# Patient Record
Sex: MALE | Race: WHITE | Employment: OTHER | URBAN - METROPOLITAN AREA
[De-identification: names, ages, dates, MRNs, and addresses within clinical notes are randomized per-mention and may not be internally consistent; named-entity substitution may affect disease eponyms.]

---

## 2017-04-09 ENCOUNTER — HOSPITAL ENCOUNTER (OUTPATIENT)
Facility: CLINIC | Age: 57
Discharge: HOME | End: 2017-04-09
Payer: COMMERCIAL

## 2017-04-09 DIAGNOSIS — Z13.1 DIABETES MELLITUS SCREENING: ICD-10-CM

## 2017-04-09 DIAGNOSIS — Z11.59 NEED FOR HEPATITIS C SCREENING TEST: ICD-10-CM

## 2017-04-09 DIAGNOSIS — Z11.4 SCREENING FOR HIV (HUMAN IMMUNODEFICIENCY VIRUS): ICD-10-CM

## 2017-04-09 LAB
GLUCOSE BLD-MCNC: 109 MG/DL (ref 70–99)
HEPATITIS C ANTIBODY: NONREACTIVE
HIV AG/AB: NONREACTIVE

## 2017-04-09 PROCEDURE — 36415 COLL VENOUS BLD VENIPUNCTURE: CPT

## 2017-04-09 PROCEDURE — 86803 HEPATITIS C AB TEST: CPT

## 2017-04-09 PROCEDURE — 87389 HIV-1 AG W/HIV-1&-2 AB AG IA: CPT

## 2017-04-09 PROCEDURE — 82947 ASSAY GLUCOSE BLOOD QUANT: CPT

## 2017-04-10 ENCOUNTER — PATIENT MESSAGE (OUTPATIENT)
Dept: FAMILY MEDICINE CLINIC | Age: 57
End: 2017-04-10

## 2017-05-19 ENCOUNTER — OFFICE VISIT (OUTPATIENT)
Dept: FAMILY MEDICINE CLINIC | Age: 57
End: 2017-05-19
Payer: COMMERCIAL

## 2017-05-19 VITALS
RESPIRATION RATE: 16 BRPM | WEIGHT: 203 LBS | HEART RATE: 84 BPM | DIASTOLIC BLOOD PRESSURE: 73 MMHG | BODY MASS INDEX: 29.06 KG/M2 | SYSTOLIC BLOOD PRESSURE: 117 MMHG

## 2017-05-19 DIAGNOSIS — M25.571 ACUTE RIGHT ANKLE PAIN: Primary | ICD-10-CM

## 2017-05-19 PROCEDURE — 99213 OFFICE O/P EST LOW 20 MIN: CPT | Performed by: NURSE PRACTITIONER

## 2017-05-19 ASSESSMENT — ENCOUNTER SYMPTOMS
SHORTNESS OF BREATH: 0
COLOR CHANGE: 0
VOMITING: 0
NAUSEA: 0

## 2017-05-22 DIAGNOSIS — F43.9 STRESS: ICD-10-CM

## 2017-06-19 ENCOUNTER — OFFICE VISIT (OUTPATIENT)
Dept: PODIATRY | Age: 57
End: 2017-06-19
Payer: COMMERCIAL

## 2017-06-19 VITALS
HEIGHT: 70 IN | HEART RATE: 81 BPM | SYSTOLIC BLOOD PRESSURE: 125 MMHG | DIASTOLIC BLOOD PRESSURE: 83 MMHG | WEIGHT: 196 LBS | BODY MASS INDEX: 28.06 KG/M2

## 2017-06-19 DIAGNOSIS — M25.473 ANKLE EDEMA: ICD-10-CM

## 2017-06-19 DIAGNOSIS — M76.61 ACHILLES TENDINITIS OF RIGHT LOWER EXTREMITY: Primary | ICD-10-CM

## 2017-06-19 DIAGNOSIS — M79.671 RIGHT FOOT PAIN: ICD-10-CM

## 2017-06-19 DIAGNOSIS — R60.0 EDEMA OF RIGHT FOOT: ICD-10-CM

## 2017-06-19 DIAGNOSIS — M21.861 GASTROCNEMIUS EQUINUS OF RIGHT LOWER EXTREMITY: ICD-10-CM

## 2017-06-19 DIAGNOSIS — M25.571 ACUTE RIGHT ANKLE PAIN: ICD-10-CM

## 2017-06-19 PROCEDURE — 99203 OFFICE O/P NEW LOW 30 MIN: CPT | Performed by: PODIATRIST

## 2017-06-19 ASSESSMENT — ENCOUNTER SYMPTOMS
DIARRHEA: 0
NAUSEA: 0
COLOR CHANGE: 0
BACK PAIN: 0
SHORTNESS OF BREATH: 0

## 2017-07-10 ENCOUNTER — OFFICE VISIT (OUTPATIENT)
Dept: PODIATRY | Age: 57
End: 2017-07-10
Payer: COMMERCIAL

## 2017-07-10 VITALS
BODY MASS INDEX: 28.06 KG/M2 | WEIGHT: 196 LBS | SYSTOLIC BLOOD PRESSURE: 125 MMHG | HEIGHT: 70 IN | HEART RATE: 76 BPM | DIASTOLIC BLOOD PRESSURE: 83 MMHG

## 2017-07-10 DIAGNOSIS — M25.473 ANKLE EDEMA: ICD-10-CM

## 2017-07-10 DIAGNOSIS — R60.0 EDEMA OF RIGHT FOOT: ICD-10-CM

## 2017-07-10 DIAGNOSIS — M21.861 GASTROCNEMIUS EQUINUS OF RIGHT LOWER EXTREMITY: ICD-10-CM

## 2017-07-10 DIAGNOSIS — M76.61 ACHILLES TENDINITIS OF RIGHT LOWER EXTREMITY: Primary | ICD-10-CM

## 2017-07-10 DIAGNOSIS — M25.571 ACUTE RIGHT ANKLE PAIN: ICD-10-CM

## 2017-07-10 DIAGNOSIS — M79.671 RIGHT FOOT PAIN: ICD-10-CM

## 2017-07-10 PROCEDURE — 99213 OFFICE O/P EST LOW 20 MIN: CPT | Performed by: PODIATRIST

## 2017-07-10 ASSESSMENT — ENCOUNTER SYMPTOMS
NAUSEA: 0
COLOR CHANGE: 0
DIARRHEA: 0
BACK PAIN: 0
SHORTNESS OF BREATH: 0

## 2017-08-17 DIAGNOSIS — F43.9 STRESS: ICD-10-CM

## 2017-08-21 ENCOUNTER — OFFICE VISIT (OUTPATIENT)
Dept: PODIATRY | Age: 57
End: 2017-08-21
Payer: COMMERCIAL

## 2017-08-21 VITALS
WEIGHT: 196 LBS | HEIGHT: 70 IN | BODY MASS INDEX: 28.06 KG/M2 | SYSTOLIC BLOOD PRESSURE: 130 MMHG | HEART RATE: 73 BPM | DIASTOLIC BLOOD PRESSURE: 82 MMHG

## 2017-08-21 DIAGNOSIS — M79.671 RIGHT FOOT PAIN: ICD-10-CM

## 2017-08-21 DIAGNOSIS — M76.61 ACHILLES TENDINITIS OF RIGHT LOWER EXTREMITY: Primary | ICD-10-CM

## 2017-08-21 DIAGNOSIS — R60.0 EDEMA OF RIGHT FOOT: ICD-10-CM

## 2017-08-21 DIAGNOSIS — M25.473 ANKLE EDEMA: ICD-10-CM

## 2017-08-21 DIAGNOSIS — M21.861 GASTROCNEMIUS EQUINUS OF RIGHT LOWER EXTREMITY: ICD-10-CM

## 2017-08-21 DIAGNOSIS — M25.571 ACUTE RIGHT ANKLE PAIN: ICD-10-CM

## 2017-08-21 PROCEDURE — 99213 OFFICE O/P EST LOW 20 MIN: CPT | Performed by: PODIATRIST

## 2017-08-21 ASSESSMENT — ENCOUNTER SYMPTOMS
SHORTNESS OF BREATH: 0
NAUSEA: 0
DIARRHEA: 0
COLOR CHANGE: 0
BACK PAIN: 0

## 2017-09-18 DIAGNOSIS — F43.9 STRESS: ICD-10-CM

## 2017-09-19 ENCOUNTER — OFFICE VISIT (OUTPATIENT)
Dept: FAMILY MEDICINE CLINIC | Age: 57
End: 2017-09-19
Payer: COMMERCIAL

## 2017-09-19 VITALS
BODY MASS INDEX: 29.22 KG/M2 | HEART RATE: 92 BPM | HEIGHT: 70 IN | DIASTOLIC BLOOD PRESSURE: 83 MMHG | RESPIRATION RATE: 16 BRPM | SYSTOLIC BLOOD PRESSURE: 128 MMHG | TEMPERATURE: 98 F | WEIGHT: 204.1 LBS

## 2017-09-19 DIAGNOSIS — F43.9 STRESS: Primary | ICD-10-CM

## 2017-09-19 DIAGNOSIS — E78.00 HIGH CHOLESTEROL: ICD-10-CM

## 2017-09-19 DIAGNOSIS — R03.0 ELEVATED BLOOD PRESSURE READING: ICD-10-CM

## 2017-09-19 DIAGNOSIS — R73.01 IMPAIRED FASTING GLUCOSE: ICD-10-CM

## 2017-09-19 DIAGNOSIS — Z23 IMMUNIZATION DUE: ICD-10-CM

## 2017-09-19 PROCEDURE — 90471 IMMUNIZATION ADMIN: CPT | Performed by: FAMILY MEDICINE

## 2017-09-19 PROCEDURE — 99214 OFFICE O/P EST MOD 30 MIN: CPT | Performed by: FAMILY MEDICINE

## 2017-09-19 PROCEDURE — 90688 IIV4 VACCINE SPLT 0.5 ML IM: CPT | Performed by: FAMILY MEDICINE

## 2017-09-21 ENCOUNTER — OFFICE VISIT (OUTPATIENT)
Dept: PODIATRY | Age: 57
End: 2017-09-21
Payer: COMMERCIAL

## 2017-09-21 VITALS
SYSTOLIC BLOOD PRESSURE: 129 MMHG | HEIGHT: 70 IN | DIASTOLIC BLOOD PRESSURE: 85 MMHG | HEART RATE: 81 BPM | WEIGHT: 196 LBS | BODY MASS INDEX: 28.06 KG/M2

## 2017-09-21 DIAGNOSIS — R60.0 EDEMA OF RIGHT FOOT: ICD-10-CM

## 2017-09-21 DIAGNOSIS — M76.61 ACHILLES TENDINITIS OF RIGHT LOWER EXTREMITY: Primary | ICD-10-CM

## 2017-09-21 DIAGNOSIS — M25.473 ANKLE EDEMA: ICD-10-CM

## 2017-09-21 DIAGNOSIS — M21.861 GASTROCNEMIUS EQUINUS OF RIGHT LOWER EXTREMITY: ICD-10-CM

## 2017-09-21 DIAGNOSIS — M25.571 ACUTE RIGHT ANKLE PAIN: ICD-10-CM

## 2017-09-21 DIAGNOSIS — M79.671 RIGHT FOOT PAIN: ICD-10-CM

## 2017-09-21 PROCEDURE — 99213 OFFICE O/P EST LOW 20 MIN: CPT | Performed by: PODIATRIST

## 2017-09-21 ASSESSMENT — ENCOUNTER SYMPTOMS
DIARRHEA: 0
NAUSEA: 0
SHORTNESS OF BREATH: 0
COLOR CHANGE: 0
BACK PAIN: 0

## 2017-10-04 ENCOUNTER — TELEPHONE (OUTPATIENT)
Dept: FAMILY MEDICINE CLINIC | Age: 57
End: 2017-10-04

## 2017-10-04 DIAGNOSIS — G56.00 CARPAL TUNNEL SYNDROME, UNSPECIFIED LATERALITY: Primary | ICD-10-CM

## 2017-10-04 NOTE — TELEPHONE ENCOUNTER
pt called stating he was just seen and forgot to ask about  referral for what he thinks is carpel tunnel.

## 2017-10-05 NOTE — TELEPHONE ENCOUNTER
Please call the patient and notify him that I have referred him to Dr. Hao Stratton. He likely has both trigger finger and carpal tunnel syndrome. Mail the referral to his home and closely encounter.

## 2017-10-19 ENCOUNTER — OFFICE VISIT (OUTPATIENT)
Dept: ORTHOPEDIC SURGERY | Age: 57
End: 2017-10-19
Payer: COMMERCIAL

## 2017-10-19 VITALS — WEIGHT: 198 LBS | BODY MASS INDEX: 28.35 KG/M2 | HEIGHT: 70 IN

## 2017-10-19 DIAGNOSIS — M65.332 TRIGGER MIDDLE FINGER OF LEFT HAND: ICD-10-CM

## 2017-10-19 DIAGNOSIS — M25.532 LEFT WRIST PAIN: Primary | ICD-10-CM

## 2017-10-19 PROCEDURE — 20600 DRAIN/INJ JOINT/BURSA W/O US: CPT | Performed by: ORTHOPAEDIC SURGERY

## 2017-10-19 PROCEDURE — 99203 OFFICE O/P NEW LOW 30 MIN: CPT | Performed by: ORTHOPAEDIC SURGERY

## 2017-10-19 RX ORDER — BETAMETHASONE SODIUM PHOSPHATE AND BETAMETHASONE ACETATE 3; 3 MG/ML; MG/ML
6 INJECTION, SUSPENSION INTRA-ARTICULAR; INTRALESIONAL; INTRAMUSCULAR; SOFT TISSUE ONCE
Status: COMPLETED | OUTPATIENT
Start: 2017-10-19 | End: 2017-10-19

## 2017-10-19 RX ORDER — LIDOCAINE HYDROCHLORIDE 10 MG/ML
1 INJECTION, SOLUTION EPIDURAL; INFILTRATION; INTRACAUDAL; PERINEURAL ONCE
Status: COMPLETED | OUTPATIENT
Start: 2017-10-19 | End: 2017-10-19

## 2017-10-19 RX ADMIN — BETAMETHASONE SODIUM PHOSPHATE AND BETAMETHASONE ACETATE 6 MG: 3; 3 INJECTION, SUSPENSION INTRA-ARTICULAR; INTRALESIONAL; INTRAMUSCULAR; SOFT TISSUE at 11:34

## 2017-10-19 RX ADMIN — LIDOCAINE HYDROCHLORIDE 1 ML: 10 INJECTION, SOLUTION EPIDURAL; INFILTRATION; INTRACAUDAL; PERINEURAL at 11:36

## 2017-10-19 NOTE — PROGRESS NOTES
Subjective:      Patient ID: Radha Martinez is a 62 y.o. male. Hand Pain    The incident occurred more than 1 week ago. The incident occurred at home. There was no injury mechanism. The pain is present in the left hand and left wrist. The quality of the pain is described as aching. The pain does not radiate. The pain is mild. The pain has been constant since the incident. Associated symptoms include numbness and tingling. Nothing aggravates the symptoms. He has tried nothing for the symptoms. The treatment provided mild relief. Patient presents with 3 issues. #1 left long finger trigger finger    History of right long finger trigger finger release with good relief results. #2 left ulnar sided wrist pain    #3 intermittent dysesthesia small and ring finger left hand that seems to originate at the hand and migrate proximally        Review of Systems   Neurological: Positive for tingling and numbness. Objective:   Physical Exam   Constitutional: He is oriented to person, place, and time. He appears well-developed and well-nourished. HENT:   Head: Normocephalic and atraumatic. Eyes: Conjunctivae and EOM are normal.   Neck: Normal range of motion. Pulmonary/Chest: Effort normal. No respiratory distress. Neurological: He is alert and oriented to person, place, and time. He has normal strength. No sensory deficit. Normal gait   Skin: Skin is warm and dry. Psychiatric: His behavior is normal. Thought content normal.   Nursing note and vitals reviewed.     Physical exam reveals no obvious tenderness palpation over the TFCC no significant pain with ulnar deviation and range of motion    Positive triggering of long finger quite substantial.    Examination ulnar nerve reveals a mild Tinel's but this does cause a dysesthesia in the typical distribution but not  Similar - started to hand and road radiate proximally    Diagnostic x-rays wrist AP lateral normal    Assessment:      Encounter Diagnoses   Name Primary?     Left wrist pain Yes    Trigger middle finger of left hand            Plan:      Inject left long finger trigger finger    Avoid highly elbow flexed positions    Follow-up 3 weeks

## 2017-10-20 ENCOUNTER — PATIENT MESSAGE (OUTPATIENT)
Dept: FAMILY MEDICINE CLINIC | Age: 57
End: 2017-10-20

## 2017-10-20 DIAGNOSIS — F43.9 STRESS: ICD-10-CM

## 2017-11-09 ENCOUNTER — OFFICE VISIT (OUTPATIENT)
Dept: ORTHOPEDIC SURGERY | Age: 57
End: 2017-11-09
Payer: COMMERCIAL

## 2017-11-09 DIAGNOSIS — M25.532 LEFT WRIST PAIN: Primary | ICD-10-CM

## 2017-11-09 DIAGNOSIS — M65.332 TRIGGER MIDDLE FINGER OF LEFT HAND: ICD-10-CM

## 2017-11-09 PROCEDURE — 99213 OFFICE O/P EST LOW 20 MIN: CPT | Performed by: ORTHOPAEDIC SURGERY

## 2017-11-09 NOTE — PROGRESS NOTES
Subjective:      Patient ID: Dragan Diaz is a 62 y.o. male. HPI      Patient here for follow-up #1 left long finger trigger finger, #2 left ulnar sided wrist pain #3 dysesthesia small and ring finger. On his last visit we injected as long finger trigger finger. Overall L3 symptoms of largely resolved and continue to improve      Review of Systems    Objective:   Physical Exam   Constitutional: He is oriented to person, place, and time. He appears well-developed and well-nourished. HENT:   Head: Normocephalic and atraumatic. Eyes: Conjunctivae and EOM are normal.   Neck: Normal range of motion. Pulmonary/Chest: Effort normal. No respiratory distress. Neurological: He is alert and oriented to person, place, and time. He has normal strength. No sensory deficit. Normal gait   Skin: Skin is warm and dry. Psychiatric: His behavior is normal. Thought content normal.   Nursing note and vitals reviewed. Assessment:      Encounter Diagnoses   Name Primary?     Left wrist pain Yes    Trigger middle finger of left hand        Appears the patient's symptom complex is largely all secondary to a long finger trigger finger as a all improved following injection        Plan:      Follow-up as needed

## 2018-02-03 ENCOUNTER — HOSPITAL ENCOUNTER (OUTPATIENT)
Facility: CLINIC | Age: 58
Discharge: HOME | End: 2018-02-03
Payer: COMMERCIAL

## 2018-02-03 DIAGNOSIS — R03.0 ELEVATED BLOOD PRESSURE READING: ICD-10-CM

## 2018-02-03 DIAGNOSIS — E78.00 HIGH CHOLESTEROL: ICD-10-CM

## 2018-02-03 DIAGNOSIS — R73.01 IMPAIRED FASTING GLUCOSE: ICD-10-CM

## 2018-02-03 LAB
ALBUMIN SERPL-MCNC: 4 G/DL (ref 3.5–5.2)
ALBUMIN/GLOBULIN RATIO: 1.5 (ref 1–2.5)
ALP BLD-CCNC: 53 U/L (ref 40–129)
ALT SERPL-CCNC: 40 U/L (ref 5–41)
ANION GAP SERPL CALCULATED.3IONS-SCNC: 10 MMOL/L (ref 9–17)
AST SERPL-CCNC: 26 U/L
BILIRUB SERPL-MCNC: 0.29 MG/DL (ref 0.3–1.2)
BUN BLDV-MCNC: 17 MG/DL (ref 6–20)
BUN/CREAT BLD: ABNORMAL (ref 9–20)
CALCIUM SERPL-MCNC: 9.1 MG/DL (ref 8.6–10.4)
CHLORIDE BLD-SCNC: 108 MMOL/L (ref 98–107)
CHOLESTEROL/HDL RATIO: 4.9
CHOLESTEROL: 228 MG/DL
CO2: 25 MMOL/L (ref 20–31)
CREAT SERPL-MCNC: 0.82 MG/DL (ref 0.7–1.2)
GFR AFRICAN AMERICAN: >60 ML/MIN
GFR NON-AFRICAN AMERICAN: >60 ML/MIN
GFR SERPL CREATININE-BSD FRML MDRD: ABNORMAL ML/MIN/{1.73_M2}
GFR SERPL CREATININE-BSD FRML MDRD: ABNORMAL ML/MIN/{1.73_M2}
GLUCOSE BLD-MCNC: 99 MG/DL (ref 70–99)
HDLC SERPL-MCNC: 47 MG/DL
LDL CHOLESTEROL: 158 MG/DL (ref 0–130)
POTASSIUM SERPL-SCNC: 4.8 MMOL/L (ref 3.7–5.3)
SODIUM BLD-SCNC: 143 MMOL/L (ref 135–144)
TOTAL PROTEIN: 6.7 G/DL (ref 6.4–8.3)
TRIGL SERPL-MCNC: 117 MG/DL
VLDLC SERPL CALC-MCNC: ABNORMAL MG/DL (ref 1–30)

## 2018-02-03 PROCEDURE — 80061 LIPID PANEL: CPT

## 2018-02-03 PROCEDURE — 36415 COLL VENOUS BLD VENIPUNCTURE: CPT

## 2018-02-03 PROCEDURE — 80053 COMPREHEN METABOLIC PANEL: CPT

## 2018-02-28 DIAGNOSIS — F43.9 STRESS: ICD-10-CM

## 2018-03-06 ENCOUNTER — OFFICE VISIT (OUTPATIENT)
Dept: FAMILY MEDICINE CLINIC | Age: 58
End: 2018-03-06
Payer: COMMERCIAL

## 2018-03-06 VITALS
SYSTOLIC BLOOD PRESSURE: 128 MMHG | HEART RATE: 78 BPM | DIASTOLIC BLOOD PRESSURE: 84 MMHG | BODY MASS INDEX: 29.78 KG/M2 | WEIGHT: 208.06 LBS | HEIGHT: 70 IN

## 2018-03-06 DIAGNOSIS — R73.01 IMPAIRED FASTING GLUCOSE: ICD-10-CM

## 2018-03-06 DIAGNOSIS — F43.9 STRESS: Primary | ICD-10-CM

## 2018-03-06 DIAGNOSIS — E78.00 HIGH CHOLESTEROL: ICD-10-CM

## 2018-03-06 LAB — HBA1C MFR BLD: 5.7 %

## 2018-03-06 PROCEDURE — 99214 OFFICE O/P EST MOD 30 MIN: CPT | Performed by: FAMILY MEDICINE

## 2018-03-06 PROCEDURE — 83036 HEMOGLOBIN GLYCOSYLATED A1C: CPT | Performed by: FAMILY MEDICINE

## 2018-03-06 NOTE — PATIENT INSTRUCTIONS
dry cough. Statins  Statins lower cholesterol. Examples include atorvastatin (Lipitor), lovastatin (Mevacor), pravastatin (Pravachol), and simvastatin (Zocor). Before you start taking a statin, make sure your doctor knows if:  · You have had a kidney transplant or other kidney problems. · You have liver disease. · You take any other prescription medicine, over-the-counter medicine, vitamins, supplements, or herbal remedies. · You are pregnant or breastfeeding. Statins can cause side effects. Call your doctor right away if you have:  · New, severe muscle aches. · Brown urine. Aspirin  Taking an aspirin every day can lower your risk for a heart attack. A heart attack occurs when a blood vessel in the heart gets blocked. When this happens, oxygen can't get to the heart muscle, and part of the heart dies. Aspirin can help prevent blood clots that can block the blood vessels. Talk to your doctor before you start taking aspirin every day. He or she may recommend that you take one low-dose aspirin (81 mg) tablet each day, with a meal and a full glass of water. Taking aspirin isn't right for everyone. This is because it can cause serious bleeding. And you may not be able to use aspirin if you:  · Have asthma. · Have an ulcer or other stomach problem. · Take some other medicine (called a blood thinner) that prevents blood clots. · Are allergic to aspirin. Before having a surgery or procedure, tell your doctor or dentist that you take aspirin. He or she will tell you if you should stop taking aspirin beforehand. Make sure that you understand exactly what your doctor wants you to do. Aspirin can cause side effects. Call your doctor right away if you have:  · Unusual bleeding or bruising. · Nausea, vomiting, or heartburn. · Black or bloody stools. Beta-blockers  Beta-blockers are used for three main reasons.  They lower blood pressure, relieve angina symptoms (such as chest pain or pressure), and reduce the

## 2018-03-06 NOTE — PROGRESS NOTES
Subjective:  Latrice Flores is in for continued evaluation and management. His chronic medical problems include the following; stress, impaired fasting glucose, and high cholesterol. He has anxiety. He is currently on sertraline 50 mg daily. He is doing well with medication. He denies any suicidal or homicidal ideation. He has a history of impaired fasting glucose. He denies any symptoms suggestive of hyper or hypoglycemia. He has high cholesterol. He is in a statin benefit group. I have recommended considering a statin. We did discuss the pathophysiology of cardiovascular disease. Review of systems per HPI, otherwise negative. Allergies; medications; past medical, surgical, family, and social history; and problem list reviewed as indicated in this encounter. Objective:  Vitals: Blood pressure 135/88, pulse 76, height 5' 10\" (1.778 m), weight 208 lb 1 oz (94.4 kg). Constitutional: He is oriented to person, place, and time. He appears well-developed and well-nourished and in no acute distress. Cardiovascular: Normal rate and regular rhythm, no murmur, rub, or gallop    Pulmonary/Chest: Effort normal and breath sounds normal. No rales or wheezes. No chest retraction. Extremities: no clubbing, cyanosis, or edema  Neurological:  CN II - XII grossly intact; no focal neurological deficits  Psychiatric:  Well groomed, well dressed. The patient maintains appropriate eye contact and does not appear to be responding to internal stimuli. No agitation    The 10-year ASCVD risk score (Ping Chacon, et al., 2013) is: 8.1%    Values used to calculate the score:      Age: 62 years      Sex: Male      Is Non- : No      Diabetic: No      Tobacco smoker: No      Systolic Blood Pressure: 294 mmHg      Is BP treated: No      HDL Cholesterol: 47 mg/dL      Total Cholesterol: 228 mg/dL     Hemoglobin A1c = 5.7%       Assessment/ Plan / Medical Decision Making  1.  Stress  sertraline

## 2018-05-03 DIAGNOSIS — F43.9 STRESS: ICD-10-CM

## 2018-10-08 ENCOUNTER — TELEPHONE (OUTPATIENT)
Dept: PRIMARY CARE CLINIC | Age: 58
End: 2018-10-08

## 2018-10-18 DIAGNOSIS — F43.9 STRESS: ICD-10-CM

## 2018-11-26 ENCOUNTER — OFFICE VISIT (OUTPATIENT)
Dept: PRIMARY CARE CLINIC | Age: 58
End: 2018-11-26
Payer: COMMERCIAL

## 2018-11-26 VITALS
RESPIRATION RATE: 16 BRPM | OXYGEN SATURATION: 98 % | HEART RATE: 86 BPM | SYSTOLIC BLOOD PRESSURE: 142 MMHG | BODY MASS INDEX: 29.73 KG/M2 | DIASTOLIC BLOOD PRESSURE: 84 MMHG | WEIGHT: 207.2 LBS

## 2018-11-26 DIAGNOSIS — R22.1 NECK MASS: ICD-10-CM

## 2018-11-26 DIAGNOSIS — R03.0 ELEVATED BLOOD PRESSURE READING: ICD-10-CM

## 2018-11-26 DIAGNOSIS — Z23 NEED FOR INFLUENZA VACCINATION: ICD-10-CM

## 2018-11-26 DIAGNOSIS — F43.9 STRESS: Primary | ICD-10-CM

## 2018-11-26 PROCEDURE — 90471 IMMUNIZATION ADMIN: CPT | Performed by: FAMILY MEDICINE

## 2018-11-26 PROCEDURE — 99214 OFFICE O/P EST MOD 30 MIN: CPT | Performed by: FAMILY MEDICINE

## 2018-11-26 PROCEDURE — 90686 IIV4 VACC NO PRSV 0.5 ML IM: CPT | Performed by: FAMILY MEDICINE

## 2018-11-26 ASSESSMENT — PATIENT HEALTH QUESTIONNAIRE - PHQ9
1. LITTLE INTEREST OR PLEASURE IN DOING THINGS: 0
SUM OF ALL RESPONSES TO PHQ QUESTIONS 1-9: 0
SUM OF ALL RESPONSES TO PHQ QUESTIONS 1-9: 0
SUM OF ALL RESPONSES TO PHQ9 QUESTIONS 1 & 2: 0
2. FEELING DOWN, DEPRESSED OR HOPELESS: 0

## 2018-11-26 NOTE — PROGRESS NOTES
Subjective:  Antonio Rodriguez presents for continued evaluation and management. He suffers from stress. He indicates that he is been on Zoloft for approximately 10 years. Medication helps him cope with the challenges of the day. He denies any suicidal or homicidal ideation. He would like to continue on the medication this time. He is complaining of a cyst.  It is located on the back of his neck. With respect to his blood pressure, he denies any chest pain or pressure. He denies any shortness of breath. Review of systems per HPI, otherwise negative. Allergies; medications; past medical, surgical, family, and social history; and problem list reviewed as indicated in this encounter. Objective:  BP (!) 142/84 (Site: Right Upper Arm, Position: Sitting, Cuff Size: Large Adult)   Pulse 86   Resp 16   Wt 207 lb 3.2 oz (94 kg)   SpO2 98%   BMI 29.73 kg/m²   Psychiatric:  Well groomed, well dressed. The patient maintains appropriate eye contact and does not appear to be responding to internal stimuli. No agitation  Skin: A 2.5 cm fluctuant mass is noted on the back of the patient's neck. Assessment:   Diagnosis Orders   1. Stress  sertraline (ZOLOFT) 50 MG tablet   2. Neck mass  [facility], Isael Akbar MD   3. Elevated blood pressure reading     4. Need for influenza vaccination  INFLUENZA, QUADV, 3 YRS AND OLDER, IM, PF, PREFILL SYR OR SDV, 0.5ML (FLUZONE QUADV, PF)       Plan:  Medications, laboratory testing, imaging, consultation, and follow up as documented in this encounter. He seems to be doing relatively well on Zoloft. I've encouraged him to Januvia the medication for the time being. With respect to his neck mass, refer to plastic surgery. I have recommended monitoring his blood pressure. Update immunizations. Follow up in our office in one year or as needed.

## 2020-01-20 ENCOUNTER — OFFICE VISIT (OUTPATIENT)
Dept: PRIMARY CARE CLINIC | Age: 60
End: 2020-01-20
Payer: COMMERCIAL

## 2020-01-20 VITALS
SYSTOLIC BLOOD PRESSURE: 150 MMHG | RESPIRATION RATE: 15 BRPM | DIASTOLIC BLOOD PRESSURE: 88 MMHG | BODY MASS INDEX: 31.34 KG/M2 | WEIGHT: 211.6 LBS | OXYGEN SATURATION: 98 % | HEART RATE: 87 BPM | HEIGHT: 69 IN

## 2020-01-20 PROCEDURE — 90471 IMMUNIZATION ADMIN: CPT | Performed by: NURSE PRACTITIONER

## 2020-01-20 PROCEDURE — 90686 IIV4 VACC NO PRSV 0.5 ML IM: CPT | Performed by: NURSE PRACTITIONER

## 2020-01-20 PROCEDURE — 99214 OFFICE O/P EST MOD 30 MIN: CPT | Performed by: NURSE PRACTITIONER

## 2020-01-20 ASSESSMENT — PATIENT HEALTH QUESTIONNAIRE - PHQ9
SUM OF ALL RESPONSES TO PHQ QUESTIONS 1-9: 0
SUM OF ALL RESPONSES TO PHQ9 QUESTIONS 1 & 2: 0
SUM OF ALL RESPONSES TO PHQ QUESTIONS 1-9: 0
1. LITTLE INTEREST OR PLEASURE IN DOING THINGS: 0
2. FEELING DOWN, DEPRESSED OR HOPELESS: 0

## 2020-01-20 NOTE — PROGRESS NOTES
appearance. HENT:      Head: Normocephalic. Eyes:      Pupils: Pupils are equal, round, and reactive to light. Neck:      Musculoskeletal: Normal range of motion and neck supple. Comments: Soft discrete mass, approximately 4cm in diameter noted to left cervical spine, no TTP, no erythema or drainage  Cardiovascular:      Rate and Rhythm: Normal rate and regular rhythm. Heart sounds: Normal heart sounds. Pulmonary:      Effort: Pulmonary effort is normal.      Breath sounds: Normal breath sounds. Musculoskeletal: Normal range of motion. Right lower leg: No edema. Left lower leg: No edema. Lymphadenopathy:      Cervical: No cervical adenopathy. Skin:     General: Skin is warm and dry. Neurological:      General: No focal deficit present. Mental Status: He is alert and oriented to person, place, and time. Psychiatric:         Mood and Affect: Mood normal.         Behavior: Behavior normal.           :       Diagnosis Orders   1. Encounter to establish care     2. Hyperlipidemia, unspecified hyperlipidemia type  Lipid Panel   3. Cyst, dermoid, scalp and neck  AUDRE[facility] - Grayson Denise MD, Plastic Surgery, [place]   4. Need for influenza vaccination  INFLUENZA, QUADV, 3 YRS AND OLDER, IM PF, PREFILL SYR OR SDV, 0.5ML (AFLURIA QUADV, PF)   5. Screening, anemia, deficiency, iron  CBC Auto Differential   6. Screening for diabetes mellitus  Comprehensive Metabolic Panel             :          1. Encounter to establish care  Screening labs ordered. Health maintenance discussed, due for repeat colonoscopy. Repeat /88 at end of visit. 2. Hyperlipidemia, unspecified hyperlipidemia type  Continue diet modifications and exercise, if lipids continue to be elevated, discussed considering medication.  - Lipid Panel; Future    3. Cyst, dermoid, scalp and neck  Referral for evaluation and removal of cyst on neck  - AUDREY Denise MD, Plastic Surgery, [place]    4.  Need for influenza vaccination  Given in office  - INFLUENZA, QUADV, 3 YRS AND OLDER, IM PF, PREFILL SYR OR SDV, 0.5ML (AFLURIA QUADV, PF)    5. Screening, anemia, deficiency, iron  - CBC Auto Differential; Future    6. Screening for diabetes mellitus  - Comprehensive Metabolic Panel; Future      Return in about 1 year (around 1/20/2021). Patient given educational materials - see patient instructions. Discussed use, benefit, and side effects of prescribed medications. All patient questions answered. Pt voiced understanding. Reviewed health maintenance. Instructed to continue current medications, diet and exercise. Patient agreed with treatment plan. Follow up as directed.        Electronicallysigned by BRIONNA Mello CNP on 1/21/2020 at 10:09 AM

## 2020-01-21 ENCOUNTER — PATIENT MESSAGE (OUTPATIENT)
Dept: PRIMARY CARE CLINIC | Age: 60
End: 2020-01-21

## 2020-01-21 ASSESSMENT — ENCOUNTER SYMPTOMS
CHEST TIGHTNESS: 0
RHINORRHEA: 0
DIARRHEA: 0
SHORTNESS OF BREATH: 0
COLOR CHANGE: 0
NAUSEA: 0
VOMITING: 0
ABDOMINAL PAIN: 0
SORE THROAT: 0

## 2020-01-31 ENCOUNTER — HOSPITAL ENCOUNTER (OUTPATIENT)
Age: 60
Setting detail: OUTPATIENT SURGERY
Discharge: HOME | End: 2020-01-31
Attending: SURGERY | Admitting: SURGERY
Payer: COMMERCIAL

## 2020-01-31 VITALS
SYSTOLIC BLOOD PRESSURE: 147 MMHG | HEIGHT: 70 IN | TEMPERATURE: 97.7 F | WEIGHT: 212 LBS | RESPIRATION RATE: 16 BRPM | HEART RATE: 73 BPM | DIASTOLIC BLOOD PRESSURE: 85 MMHG | BODY MASS INDEX: 30.35 KG/M2 | OXYGEN SATURATION: 95 %

## 2020-01-31 PROCEDURE — 7100000010 HC PHASE II RECOVERY - FIRST 15 MIN: Performed by: SURGERY

## 2020-01-31 PROCEDURE — 3600000002 HC SURGERY LEVEL 2 BASE: Performed by: SURGERY

## 2020-01-31 PROCEDURE — 2709999900 HC NON-CHARGEABLE SUPPLY: Performed by: SURGERY

## 2020-01-31 PROCEDURE — 7100000011 HC PHASE II RECOVERY - ADDTL 15 MIN: Performed by: SURGERY

## 2020-01-31 PROCEDURE — 88304 TISSUE EXAM BY PATHOLOGIST: CPT

## 2020-01-31 PROCEDURE — 3600000012 HC SURGERY LEVEL 2 ADDTL 15MIN: Performed by: SURGERY

## 2020-01-31 PROCEDURE — 2500000003 HC RX 250 WO HCPCS: Performed by: SURGERY

## 2020-01-31 RX ORDER — CEPHALEXIN 500 MG/1
500 CAPSULE ORAL 4 TIMES DAILY
Qty: 40 CAPSULE | Refills: 0 | Status: SHIPPED | OUTPATIENT
Start: 2020-01-31 | End: 2020-07-01 | Stop reason: ALTCHOICE

## 2020-01-31 RX ORDER — CHLORAL HYDRATE 500 MG
1000 CAPSULE ORAL DAILY
COMMUNITY

## 2020-01-31 RX ORDER — LIDOCAINE HYDROCHLORIDE AND EPINEPHRINE 10; 10 MG/ML; UG/ML
INJECTION, SOLUTION INFILTRATION; PERINEURAL PRN
Status: DISCONTINUED | OUTPATIENT
Start: 2020-01-31 | End: 2020-01-31 | Stop reason: ALTCHOICE

## 2020-01-31 RX ORDER — OXYCODONE HYDROCHLORIDE AND ACETAMINOPHEN 5; 325 MG/1; MG/1
1 TABLET ORAL EVERY 6 HOURS PRN
Qty: 12 TABLET | Refills: 0 | Status: SHIPPED | OUTPATIENT
Start: 2020-01-31 | End: 2020-02-07

## 2020-01-31 ASSESSMENT — PAIN SCALES - GENERAL: PAINLEVEL_OUTOF10: 0

## 2020-01-31 ASSESSMENT — PAIN - FUNCTIONAL ASSESSMENT: PAIN_FUNCTIONAL_ASSESSMENT: 0-10

## 2020-01-31 NOTE — BRIEF OP NOTE
Brief Postoperative Note  ______________________________________________________________    Patient: Cecilia Ng  YOB: 1960  MRN: [id number]  Date of Procedure: 1/31/2020    Pre-Op Diagnosis: DX SUBCUTANEOUS LESION NECK    Post-Op Diagnosis: Same       Procedure(s):  EXCISION SUBCUTANEOUS LESION NECK    Anesthesia: Anesthesia type not filed in the log.     Surgeon(s):  Terri Mason MD    Assistant:      Estimated Blood Loss (mL): less than 50     Complications: None    Specimens:   ID Type Source Tests Collected by Time Destination   A : SUBCUTANEOUS LESION POST NECK Tissue Neck SURGICAL PATHOLOGY Terri Mason MD 1/31/2020 2544        Implants:  * No implants in log *      Drains: * No LDAs found *    Findings: adherent to deep muscles    Terri Mason MD  Date: 1/31/2020  Time: 9:57 AM

## 2020-01-31 NOTE — H&P
History and Physical Update    Pt Name: Ivana Herrera  MRN: [id number]  YOB: 1960  Date of evaluation: 1/31/2020      [x] I have reviewed the progress note found in Epic by Carlos Chavez CNP from 01/20/2020 which meets the criteria for an Interval History and Physical note. [x] I have examined  Ivana Herrera a 61 y.o., male who is scheduled for a subcutaneous neck lesion excision by Dr. Jay Miller due to subcutaneous neck lesion. The patient denies health changes since his appointment with Carlos Chavez CNP on 01/20/2020. Pt denies fever, chills, productive cough, SOB, chest pain, open sores, rashes, and wounds. Pt denies history of diabetes, a MI, and respiratory disease. Fish oil was last taken this morning. Motrin was last taken on 01/30/2020. Vital signs: BP (!) 143/86   Pulse 84   Temp 98.2 °F (36.8 °C) (Oral)   Resp 16   Ht 5' 10\" (1.778 m)   Wt 212 lb (96.2 kg)   SpO2 94%   BMI 30.42 kg/m²      Allergies:  Patient has no known allergies. Past medical history, surgical history, social history, and family history were reviewed and updated in EPIC as indicated. Medications:    Prior to Admission medications    Medication Sig Start Date End Date Taking? Authorizing Provider   Omega-3 Fatty Acids (FISH OIL) 1000 MG CAPS Take 1,000 mg by mouth daily   Yes Historical Provider, MD   sertraline (ZOLOFT) 50 MG tablet TAKE ONE TABLET BY MOUTH DAILY 1/21/20  Yes BRIONNA Chaney CNP   Multiple Vitamin (MULTI-VITAMINS PO) Take by mouth   Yes Historical Provider, MD       This is a 61 y.o. male who is pleasant, cooperative, alert and oriented x 3, in no acute distress. Obese. Heart: Regular rate and rhythm without murmur, gallop, or rub. Lungs: Normal respiratory effort, unlabored, and clear to auscultation without wheezes or rales bilaterally. Abdomen: Soft, non-tender, non-distended with active bowel sounds.    Pedal pulses: 2+ bilaterally         Labs:  No results for input(s): HGB, HCT, WBC, MCV, PLT, NA, K, CL, CO2, BUN, CREATININE, GLUCOSE, INR, PROTIME, APTT, AST, ALT, LABALBU, HCG in the last 720 hours. JENNIFER Sullivan  Electronically signed 1/31/2020 at 8:25 AM        BRIONNA Goodman CNP   Nurse Practitioner   Nurse Practitioner   Progress Notes   Signed   Encounter Date:  1/20/2020          Related encounter: Office Visit from 1/20/2020 in [facility]         Signed            Show:Clear all  [x]Manual[x]Template[]Copied    Added by:  [x]BRIONNA Yousif CNP    []Sheba for details     [address] [facility]IMARY [facility]  [address] [place] [address]  Dept: [phone]  Dept Fax: [phone]     Jacinta Yost is a 61 y.o. male who presentstoday for his medical conditions/complaints as noted below.   Jacinta Yost is c/o of      Chief Complaint   Patient presents with    Establish Care    Hyperlipidemia    Stress            HPI:      Here to establish care  Chronic conditions include stress/anxiety well controlled with zoloft 50mg daily, and high cholesterol that he chooses to manage with diet and exercise, also takes daily multivitamin and coQ10  Has cyst on back of neck that he previously had a referral for cosmetic surgery for but never went, would like referral again to go have it evaluated/removed, had drained once by his dermatologist but it returned, has not gotten larger             Hemoglobin A1C (%)   Date Value   03/06/2018 5.7                                                                           ( goal A1C is < 7)   No results found for: LABMICR      LDL Cholesterol (mg/dL)   Date Value   02/03/2018 158 (H)   07/10/2016 145 (H)                                                   (goal LDL is <100)       AST (U/L)   Date Value   02/03/2018 26          ALT (U/L)   Date Value   02/03/2018 40      BUN (mg/dL)   Date Value   02/03/2018 17 BP Readings from Last 3 Encounters:   01/20/20 (!) 150/88   11/26/18 (!) 142/84   03/06/18 128/84                                                                                       ([id number])          Past Medical History:   Diagnosis Date    Adenomatous polyp      Diverticulosis      Hemorrhoids      High cholesterol 7/11/2016            Past Surgical History:   Procedure Laterality Date    COLONOSCOPY   04-15-16     ADENOMATOUS POLYP, DIVERTICULOSIS, INTERNAL AND EXTERNAL HEMORRHOIDS     FINGER TRIGGER RELEASE Right      KNEE SURGERY Right      VASECTOMY                   Family History   Problem Relation Age of Onset    Cancer Maternal Grandfather           Social History            Tobacco Use    Smoking status: Former Smoker       Packs/day: 0.25       Years: 15.00       Pack years: 3.75       Last attempt to quit: 01/2010       Years since quitting: 10.0    Smokeless tobacco: Never Used   Substance Use Topics    Alcohol use: Yes       Comment: rarely             Current Outpatient Medications   Medication Sig Dispense Refill    sertraline (ZOLOFT) 50 MG tablet TAKE ONE TABLET BY MOUTH DAILY 30 tablet 11    Multiple Vitamin (MULTI-VITAMINS PO) Take by mouth        Coenzyme Q10 (CO Q 10 PO) Take by mouth          No current facility-administered medications for this visit. No Known Allergies          Health Maintenance   Topic Date Due    DTaP/Tdap/Td vaccine (1 - Tdap) 04/13/1971    Shingles Vaccine (1 of 2) 04/13/2010    A1C test (Diabetic or Prediabetic)  03/06/2019    Colon cancer screen colonoscopy  04/15/2019    Lipid screen  02/03/2023    Flu vaccine  Completed    Hepatitis C screen  Completed    HIV screen  Completed    Pneumococcal 0-64 years Vaccine  Aged Out         Subjective:      Review of Systems   Constitutional: Negative for activity change, fatigue and fever. HENT: Negative for congestion, rhinorrhea and sore throat.     Eyes: Negative for visual disturbance. Respiratory: Negative for chest tightness and shortness of breath. Cardiovascular: Negative for chest pain and palpitations. Gastrointestinal: Negative for abdominal pain, diarrhea, nausea and vomiting. Endocrine: Negative for polydipsia. Genitourinary: Negative for difficulty urinating. Musculoskeletal: Negative for arthralgias and myalgias. Skin: Negative for color change. Cyst to left side cervical spine   Neurological: Negative for weakness and headaches. Psychiatric/Behavioral: Negative for behavioral problems. The patient is not nervous/anxious. Stress      All other systems negative  Objective:   BP (!) 150/88   Pulse 87   Resp 15   Ht 5' 9\" (1.753 m)   Wt 211 lb 9.6 oz (96 kg)   SpO2 98%   BMI 31.25 kg/m²   Physical Exam  Vitals signs reviewed. Constitutional:       General: He is not in acute distress. Appearance: Normal appearance. HENT:      Head: Normocephalic. Eyes:      Pupils: Pupils are equal, round, and reactive to light. Neck:      Musculoskeletal: Normal range of motion and neck supple. Comments: Soft discrete mass, approximately 4cm in diameter noted to left cervical spine, no TTP, no erythema or drainage  Cardiovascular:      Rate and Rhythm: Normal rate and regular rhythm. Heart sounds: Normal heart sounds. Pulmonary:      Effort: Pulmonary effort is normal.      Breath sounds: Normal breath sounds. Musculoskeletal: Normal range of motion. Right lower leg: No edema. Left lower leg: No edema. Lymphadenopathy:      Cervical: No cervical adenopathy. Skin:     General: Skin is warm and dry. Neurological:      General: No focal deficit present. Mental Status: He is alert and oriented to person, place, and time. Psychiatric:         Mood and Affect: Mood normal.         Behavior: Behavior normal.               :     Diagnosis Orders   1. Encounter to establish care      2.  Hyperlipidemia, unspecified hyperlipidemia type  Lipid Panel   3. Cyst, dermoid, scalp and neck  AUDREY Davidson MD, Plastic Surgery, [facility]   4. Need for influenza vaccination  INFLUENZA, QUADV, 3 YRS AND OLDER, IM PF, PREFILL SYR OR SDV, 0.5ML (AFLURIA QUADV, PF)   5. Screening, anemia, deficiency, iron  CBC Auto Differential   6. Screening for diabetes mellitus  Comprehensive Metabolic Panel            :         1. Encounter to establish care  Screening labs ordered. Health maintenance discussed, due for repeat colonoscopy. Repeat /88 at end of visit. 2. Hyperlipidemia, unspecified hyperlipidemia type  Continue diet modifications and exercise, if lipids continue to be elevated, discussed considering medication.  - Lipid Panel; Future     3. Cyst, dermoid, scalp and neck  Referral for evaluation and removal of cyst on neck  - AUDREY Davidson MD, Plastic Surgery, [facility]     4. Need for influenza vaccination  Given in office  - INFLUENZA, QUADV, 3 YRS AND OLDER, IM PF, PREFILL SYR OR SDV, 0.5ML (AFLURIA QUADV, PF)     5. Screening, anemia, deficiency, iron  - CBC Auto Differential; Future     6. Screening for diabetes mellitus  - Comprehensive Metabolic Panel; Future        Return in about 1 year (around 1/20/2021). Patient given educational materials - see patient instructions. Discussed use, benefit, and side effects of prescribed medications. All patient questions answered. Pt voiced understanding. Reviewed health maintenance. Instructed to continue current medications, diet and exercise. Patient agreed with treatment plan. Follow up as directed.          Electronicallysigned by BRIONNA Hinkle CNP on 1/21/2020 at 10:09 AM

## 2020-01-31 NOTE — OP NOTE
[address] [address]                [address] [facility], [address] [place] [address]                                OPERATIVE REPORT    PATIENT NAME: Suzanne Joy                  :        1960  MED REC NO:   [id number]                             ROOM:  ACCOUNT NO:   [de-identified]                           ADMIT DATE: 2020  PROVIDER:     Lachelle Krishnamurthy    DATE OF PROCEDURE:  2020    PREOPERATIVE DIAGNOSIS:  Subcutaneous lesion of the posterior neck. POSTOPERATIVE DIAGNOSIS:  Subcutaneous lesion of the posterior neck. PROCEDURES PERFORMED:  1. Excision of subcutaneous lesion of the neck noted to be densely  adherent to the muscle fascia approximately 5 cm in diameter. 2.  Complex multilayer closure of posterior neck wound. SURGEON:  Dr. Melly Alvarado:  Nelia Hanley    ANESTHESIA:  Local.    COMPLICATION:  None. ESTIMATED BLOOD LOSS:  Minimal.    INDICATION AND SIGNIFICANT HISTORY:  The patient is a very pleasant  54-year-old gentleman seen with fully growing subcutaneous lesion in the  posterior neck. Previous steroid injections were unsuccessful in  resolving this. Risks and benefits of wide excision were explained to  the patient. Risks such as bleeding, infection, scars, need for  additional procedures, wound healing problems, patient's  dissatisfaction, damage to the deeper structures, permanent sensation  changes, permanent pain, recurrence of the lesion, all explained. He  had the opportunity to ask variety of questions, all of which were  answered for him. He demonstrates understanding and provides informed  consent. OPERATIVE PROCEDURE:  The patient was marked in the preoperative holding  area and 1% lidocaine with epinephrine was infiltrated into the skin and  subcutaneous tissue in order to provide for local field block. Then,  the patient was brought to the operating room and placed in the prone  position. After sterile prep and drape, careful attention to aseptic  surgical technique was maintained at all times. A curvilinear incision  was carried down through the skin, initially blunt and sharp dissection  was used to dissect down through the subcutaneous tissue and here I was  able to identify a mostly fatty lesion, densely adherent to the muscles  of the posterior neck. Bovie electrocautery was used to obtain  meticulous hemostasis and it was gently dissected off of the muscle. The lesion was sent as specific pathology and noted to be approximately  5 cm in diameter. Wound was irrigated out. I inspected again for  meticulous hemostasis. Meticulous hemostasis having been validated with  the use of Bovie electrocautery. A series of deep fascial sutures of  3-0 Monocryl were used to tack down the deeply undermined flaps that  were 6 cm in each direction followed by deep dermal intracuticular 3-0  Monocryl suture followed by running intracuticular 3-0 Monocryl suture  followed by Steri-Strips in order to apply for a complex multilayered  closure 6 cm in length. Sterile dressings were applied. The patient  was transported to recovery room in stable condition. Sponge, needle,  and instrument counts were reported to be correct x2 at the end of case.         Jailene Bhagat    D: 01/31/2020 10:13:56       T: 01/31/2020 11:21:08     [email or web address]  Job#: [id number]     Doc#: [id number]    CC:  [address]

## 2020-02-03 LAB — SURGICAL PATHOLOGY REPORT: NORMAL

## 2020-02-06 ENCOUNTER — TELEPHONE (OUTPATIENT)
Dept: GASTROENTEROLOGY | Age: 60
End: 2020-02-06

## 2020-02-17 ENCOUNTER — HOSPITAL ENCOUNTER (OUTPATIENT)
Facility: CLINIC | Age: 60
Discharge: HOME | End: 2020-02-17
Payer: COMMERCIAL

## 2020-02-17 LAB
ABSOLUTE EOS #: 0.14 K/UL (ref 0–0.44)
ABSOLUTE IMMATURE GRANULOCYTE: 0.03 K/UL (ref 0–0.3)
ABSOLUTE LYMPH #: 1.82 K/UL (ref 1.1–3.7)
ABSOLUTE MONO #: 0.46 K/UL (ref 0.1–1.2)
ALBUMIN SERPL-MCNC: 3.8 G/DL (ref 3.5–5.2)
ALBUMIN/GLOBULIN RATIO: 1.6 (ref 1–2.5)
ALP BLD-CCNC: 56 U/L (ref 40–129)
ALT SERPL-CCNC: 46 U/L (ref 5–41)
ANION GAP SERPL CALCULATED.3IONS-SCNC: 13 MMOL/L (ref 9–17)
AST SERPL-CCNC: 28 U/L
BASOPHILS # BLD: 1 % (ref 0–2)
BASOPHILS ABSOLUTE: 0.05 K/UL (ref 0–0.2)
BILIRUB SERPL-MCNC: <0.1 MG/DL (ref 0.3–1.2)
BUN BLDV-MCNC: 19 MG/DL (ref 6–20)
BUN/CREAT BLD: ABNORMAL (ref 9–20)
CALCIUM SERPL-MCNC: 9.4 MG/DL (ref 8.6–10.4)
CHLORIDE BLD-SCNC: 110 MMOL/L (ref 98–107)
CHOLESTEROL/HDL RATIO: 5.1
CHOLESTEROL: 187 MG/DL
CO2: 21 MMOL/L (ref 20–31)
CREAT SERPL-MCNC: 0.83 MG/DL (ref 0.7–1.2)
DIFFERENTIAL TYPE: ABNORMAL
EOSINOPHILS RELATIVE PERCENT: 2 % (ref 1–4)
GFR AFRICAN AMERICAN: >60 ML/MIN
GFR NON-AFRICAN AMERICAN: >60 ML/MIN
GFR SERPL CREATININE-BSD FRML MDRD: ABNORMAL ML/MIN/{1.73_M2}
GFR SERPL CREATININE-BSD FRML MDRD: ABNORMAL ML/MIN/{1.73_M2}
GLUCOSE BLD-MCNC: 128 MG/DL (ref 70–99)
HCT VFR BLD CALC: 46 % (ref 40.7–50.3)
HDLC SERPL-MCNC: 37 MG/DL
HEMOGLOBIN: 15.2 G/DL (ref 13–17)
IMMATURE GRANULOCYTES: 0 %
LDL CHOLESTEROL: ABNORMAL MG/DL (ref 0–130)
LYMPHOCYTES # BLD: 25 % (ref 24–43)
MCH RBC QN AUTO: 33 PG (ref 25.2–33.5)
MCHC RBC AUTO-ENTMCNC: 33 G/DL (ref 28.4–34.8)
MCV RBC AUTO: 100 FL (ref 82.6–102.9)
MONOCYTES # BLD: 6 % (ref 3–12)
NRBC AUTOMATED: 0 PER 100 WBC
PDW BLD-RTO: 13.4 % (ref 11.8–14.4)
PLATELET # BLD: 259 K/UL (ref 138–453)
PLATELET ESTIMATE: ABNORMAL
PMV BLD AUTO: 11.5 FL (ref 8.1–13.5)
POTASSIUM SERPL-SCNC: 3.8 MMOL/L (ref 3.7–5.3)
RBC # BLD: 4.6 M/UL (ref 4.21–5.77)
RBC # BLD: ABNORMAL 10*6/UL
SEG NEUTROPHILS: 66 % (ref 36–65)
SEGMENTED NEUTROPHILS ABSOLUTE COUNT: 4.93 K/UL (ref 1.5–8.1)
SODIUM BLD-SCNC: 144 MMOL/L (ref 135–144)
TOTAL PROTEIN: 6.2 G/DL (ref 6.4–8.3)
TRIGL SERPL-MCNC: 570 MG/DL
VLDLC SERPL CALC-MCNC: ABNORMAL MG/DL (ref 1–30)
WBC # BLD: 7.4 K/UL (ref 3.5–11.3)
WBC # BLD: ABNORMAL 10*3/UL

## 2020-02-17 PROCEDURE — 85025 COMPLETE CBC W/AUTO DIFF WBC: CPT

## 2020-02-17 PROCEDURE — 80053 COMPREHEN METABOLIC PANEL: CPT

## 2020-02-17 PROCEDURE — 80061 LIPID PANEL: CPT

## 2020-02-17 PROCEDURE — 83721 ASSAY OF BLOOD LIPOPROTEIN: CPT

## 2020-02-17 PROCEDURE — 36415 COLL VENOUS BLD VENIPUNCTURE: CPT

## 2020-02-18 LAB — LDL CHOLESTEROL DIRECT: 99 MG/DL

## 2020-07-01 ENCOUNTER — TELEMEDICINE (OUTPATIENT)
Dept: PRIMARY CARE CLINIC | Age: 60
End: 2020-07-01
Payer: COMMERCIAL

## 2020-07-01 ENCOUNTER — NURSE TRIAGE (OUTPATIENT)
Dept: OTHER | Facility: CLINIC | Age: 60
End: 2020-07-01

## 2020-07-01 PROCEDURE — 99214 OFFICE O/P EST MOD 30 MIN: CPT | Performed by: NURSE PRACTITIONER

## 2020-07-01 ASSESSMENT — ENCOUNTER SYMPTOMS
RHINORRHEA: 0
NAUSEA: 0
DIARRHEA: 0
VOMITING: 0
COLOR CHANGE: 0
ABDOMINAL PAIN: 0
SHORTNESS OF BREATH: 0
SORE THROAT: 0
CHEST TIGHTNESS: 0

## 2020-07-01 NOTE — PROGRESS NOTES
[address] [facility] PRIMARY [facility]  [facility] [address] [facility] [address]  [address] [address]  Dept: [phone]  Dept Fax: [phone]    Ana Lilia Barrow is a 61 y.o. male who presentstoday for his medical conditions/complaints as noted below.   Ana Lilia Barrow is c/o of  Chief Complaint   Patient presents with    Ankle Pain         HPI:     Here via video visit with complaint of right ankle pain x 4 weeks ago, rates 6/10  Has twisted right ankle twice, using ibuprofen and tylenol with some relief, also bought brace that gives some supprt and seems to help  Denies any bruising or redness, has some slight swelling, denies any increased activity or running  Is concerned that he tore something, has hx of achilles tendonitis few years ago, he thinks this feels different, followed with podiatry Dr. Garth Childs  Has some slight decreased ROM, no loss of sensation, no numbness or tingling  Has appt with ortho in a few weeks, Dr. Amanda Mendez but would like to try to see someone sooner    Has open orders for repeat labs since he did not fast for previous screening labs   Managing HLD with diet and exercise  Stress/anxiety well controlled with zoloft 50 mg daily      Hemoglobin A1C (%)   Date Value   2018 5.7             ( goal A1C is < 7)   No results found for: LABMICR  LDL Cholesterol (mg/dL)   Date Value   2020 158 (H)   07/10/2016 145 (H)       (goal LDL is <100)   AST (U/L)   Date Value   2020 28     ALT (U/L)   Date Value   2020 46 (H)     BUN (mg/dL)   Date Value   2020 19     BP Readings from Last 3 Encounters:   20 (!) 147/85   20 (!) 150/88   18 (!) 142/84          ([id number])    Past Medical History:   Diagnosis Date    Adenomatous polyp     Diverticulosis     Hemorrhoids     High cholesterol 2016      Past Surgical History:   Procedure Laterality Date    COLONOSCOPY  04-15-16    ADENOMATOUS POLYP, DIVERTICULOSIS, INTERNAL AND EXTERNAL HEMORRHOIDS     FINGER TRIGGER RELEASE Right     KNEE SURGERY Right     NECK SURGERY N/A 1/31/2020    EXCISION SUBCUTANEOUS LESION NECK performed by Alda Hernandez MD at [address]  01/31/2020    EXCISION SUBCUTANEOUS LESION NECK (    VASECTOMY         Family History   Problem Relation Age of Onset    Cancer Maternal Grandfather        Social History     Tobacco Use    Smoking status: Former Smoker     Packs/day: 0.25     Years: 15.00     Pack years: 3.75     Last attempt to quit: 01/2010     Years since quitting: 10.5    Smokeless tobacco: Never Used   Substance Use Topics    Alcohol use: Yes     Comment: rarely      Current Outpatient Medications   Medication Sig Dispense Refill    Omega-3 Fatty Acids (FISH OIL) 1000 MG CAPS Take 1,000 mg by mouth daily      sertraline (ZOLOFT) 50 MG tablet TAKE ONE TABLET BY MOUTH DAILY 30 tablet 11    Multiple Vitamin (MULTI-VITAMINS PO) Take by mouth       No current facility-administered medications for this visit. No Known Allergies    Health Maintenance   Topic Date Due    DTaP/Tdap/Td vaccine (1 - Tdap) 04/13/1979    Shingles Vaccine (1 of 2) 04/13/2010    Colon cancer screen colonoscopy  04/15/2019    A1C test (Diabetic or Prediabetic)  01/21/2021 (Originally 3/6/2019)    Flu vaccine (1) 09/01/2020    Lipid screen  02/17/2025    Hepatitis C screen  Completed    HIV screen  Completed    Hepatitis A vaccine  Aged Out    Hepatitis B vaccine  Aged Out    Hib vaccine  Aged Out    Meningococcal (ACWY) vaccine  Aged Out    Pneumococcal 0-64 years Vaccine  Aged Out       Subjective:      Review of Systems   Constitutional: Negative for activity change, fatigue and fever. HENT: Negative for congestion, rhinorrhea and sore throat. Eyes: Negative for visual disturbance. Respiratory: Negative for chest tightness and shortness of breath. Cardiovascular: Negative for chest pain and palpitations. Gastrointestinal: Negative for abdominal pain, diarrhea, nausea and vomiting. Endocrine: Negative for polydipsia. Genitourinary: Negative for difficulty urinating. Musculoskeletal: Positive for arthralgias and myalgias. Skin: Negative for color change. Neurological: Negative for weakness and headaches. Psychiatric/Behavioral: Negative for behavioral problems. The patient is not nervous/anxious. All other systems reviewed and are negative. Objective: There were no vitals taken for this visit. Physical Exam  Constitutional:       General: He is not in acute distress. Appearance: He is not ill-appearing. Neurological:      Mental Status: He is alert and oriented to person, place, and time. Psychiatric:         Mood and Affect: Mood normal.         Behavior: Behavior normal.         Thought Content: Thought content normal.         Judgment: Judgment normal.           :       Diagnosis Orders   1. Achilles tendon pain  [facility], [facility], [place]   2. Acute right ankle pain  [facility], [place]   3. Stress     4. High cholesterol               :          1. Achilles tendon pain  2. Acute right ankle pain  Recommended pt see podiatry or ortho, opted for ortho in [place], referral given, continue RICE and NSAID as needed, declined steroid, continue brace for comfort, if pain persists or worsens, recommend follow up in office for physical exam  - [address], , Orthopedic Surgery, [place]    3. Stress/anxiety  Well controlled, continue zoloft 50mg daily    4. HLD  Stable, need repeat labs, open orders    Prefers not to come into office during Covid, would like to see in office when comfortable to recheck BP, has been elevated, asymptomatic.   The 10-year ASCVD risk score (Quan Lu, et al., 2013) is: 12.6%    Values used to calculate the score:      Age: 61 years      Sex: Male      Is Non-  authenticate this note.     Electronicallysigned by BRIONNA Batista CNP on 7/1/2020 at 3:56 PM

## 2020-08-02 ENCOUNTER — HOSPITAL ENCOUNTER (OUTPATIENT)
Age: 60
Setting detail: SPECIMEN
Discharge: HOME | End: 2020-08-02
Payer: COMMERCIAL

## 2020-08-02 ENCOUNTER — OFFICE VISIT (OUTPATIENT)
Dept: PRIMARY CARE CLINIC | Age: 60
End: 2020-08-02
Payer: COMMERCIAL

## 2020-08-02 VITALS — TEMPERATURE: 97.5 F | OXYGEN SATURATION: 96 % | HEART RATE: 70 BPM

## 2020-08-02 PROCEDURE — 99213 OFFICE O/P EST LOW 20 MIN: CPT | Performed by: NURSE PRACTITIONER

## 2020-08-02 RX ORDER — BENZONATATE 100 MG/1
100 CAPSULE ORAL 2 TIMES DAILY PRN
Qty: 20 CAPSULE | Refills: 0 | Status: SHIPPED | OUTPATIENT
Start: 2020-08-02 | End: 2020-08-09

## 2020-08-02 ASSESSMENT — ENCOUNTER SYMPTOMS
SWOLLEN GLANDS: 1
EYE ITCHING: 0
NAUSEA: 0
COUGH: 1
SHORTNESS OF BREATH: 0
SORE THROAT: 0
DIARRHEA: 0
ABDOMINAL PAIN: 0
EYE DISCHARGE: 0
CHEST TIGHTNESS: 0
ALLERGIC/IMMUNOLOGIC NEGATIVE: 1
VOMITING: 0
EYES NEGATIVE: 1

## 2020-08-02 ASSESSMENT — PATIENT HEALTH QUESTIONNAIRE - PHQ9
SUM OF ALL RESPONSES TO PHQ QUESTIONS 1-9: 0
1. LITTLE INTEREST OR PLEASURE IN DOING THINGS: 0
SUM OF ALL RESPONSES TO PHQ9 QUESTIONS 1 & 2: 0
SUM OF ALL RESPONSES TO PHQ QUESTIONS 1-9: 0
2. FEELING DOWN, DEPRESSED OR HOPELESS: 0

## 2020-08-02 NOTE — PROGRESS NOTES
[address] [facility]  [address] [place] [address] [address] [address]  Dept: [phone]  Dept Fax: [phone]    Jolene Kemp is a 61 y.o. male who presents today forhis medical conditions/complaints as noted below. Jolene Kemp is c/o of   Chief Complaint   Patient presents with    Covid Testing     swollen glands, headache      HPI:     Headache    This is a new problem. The current episode started in the past 7 days. The problem occurs daily. The problem has been waxing and waning. The pain is located in the frontal region. Associated symptoms include coughing and swollen glands. Pertinent negatives include no abdominal pain, dizziness, fever, nausea, neck pain, sore throat, vomiting or weakness. He has tried acetaminophen for the symptoms. The treatment provided mild relief.          Past Medical History:   Diagnosis Date    Adenomatous polyp     Diverticulosis     Hemorrhoids     High cholesterol 7/11/2016      Past Surgical History:   Procedure Laterality Date    COLONOSCOPY  04-15-16    ADENOMATOUS POLYP, DIVERTICULOSIS, INTERNAL AND EXTERNAL HEMORRHOIDS     FINGER TRIGGER RELEASE Right     KNEE SURGERY Right     NECK SURGERY N/A 1/31/2020    EXCISION SUBCUTANEOUS LESION NECK performed by Sybil Phillips MD at [facility].  01/31/2020    EXCISION SUBCUTANEOUS LESION NECK (    VASECTOMY         Family History   Problem Relation Age of Onset    Cancer Maternal Grandfather        Social History     Tobacco Use    Smoking status: Former Smoker     Packs/day: 0.25     Years: 15.00     Pack years: 3.75     Last attempt to quit: 01/2010     Years since quitting: 10.5    Smokeless tobacco: Never Used   Substance Use Topics    Alcohol use: Yes     Comment: rarely      Current Outpatient Medications   Medication Sig Dispense Refill    benzonatate (TESSALON) 100 MG capsule Take 1 capsule by mouth 2 times daily as needed for Cough 20 capsule movement or transmitted upper airway sounds. No decreased breath sounds, wheezing, rhonchi or rales. Abdominal:      General: Bowel sounds are normal.      Palpations: Abdomen is soft. Musculoskeletal: Normal range of motion. Lymphadenopathy:      Cervical: No cervical adenopathy. Skin:     General: Skin is warm and dry. Findings: No rash. Neurological:      Mental Status: He is alert and oriented to person, place, and time. Cranial Nerves: No cranial nerve deficit. Coordination: Coordination normal.      Deep Tendon Reflexes: Reflexes are normal and symmetric. Psychiatric:         Thought Content: Thought content normal.       Pulse 70   Temp 97.5 °F (36.4 °C) (Temporal)   SpO2 96%     Assessment:       Diagnosis Orders   1. Suspected COVID-19 virus infection  COVID-19 Ambulatory    benzonatate (TESSALON) 100 MG capsule   2. Acute intractable headache, unspecified headache type  COVID-19 Ambulatory   3. Glands swollen  COVID-19 Ambulatory   4. Cough  benzonatate (TESSALON) 100 MG capsule           Plan:     1.) Covid swab obtained and sent to lab- will call with results   2.) Quarantine while waiting for Covid results   3.) Symptom management encouraged   4.) Follow-up with PCP PRN     Advance Care Planning  People with COVID-19 may have no symptoms, mild symptoms, such as fever, cough, and shortness of breath or they may have more severe illness, developing severe and fatal pneumonia. As a result, Advance Care Planning with attention to naming a health care decision maker (someone you trust to make healthcare decisions for you if you could not speak for yourself) and sharing other health care preferences is important BEFORE a possible health crisis. Please contact your Primary Care Provider to discuss Advance Care Planning.     Preventing the Spread of Coronavirus Disease 2019 in Homes and Residential Communities  For the most recent information go to [employer]    Prevention steps for People with confirmed or suspected COVID-19 (including persons under investigation) who do not need to be hospitalized  and   People with confirmed COVID-19 who were hospitalized and determined to be medically stable to go home    Your healthcare provider and public health staff will evaluate whether you can be cared for at home. If it is determined that you do not need to be hospitalized and can be isolated at home, you will be monitored by staff from your local or state health department. You should follow the prevention steps below until a healthcare provider or local or state health department says you can return to your normal activities. Stay home except to get medical care  People who are mildly ill with COVID-19 are able to isolate at home during their illness. You should restrict activities outside your home, except for getting medical care. Do not go to work, school, or public areas. Avoid using public transportation, ride-sharing, or taxis. Separate yourself from other people and animals in your home  People: As much as possible, you should stay in a specific room and away from other people in your home. Also, you should use a separate bathroom, if available. Animals: You should restrict contact with pets and other animals while you are sick with COVID-19, just like you would around other people. Although there have not been reports of pets or other animals becoming sick with COVID-19, it is still recommended that people sick with COVID-19 limit contact with animals until more information is known about the virus. When possible, have another member of your household care for your animals while you are sick. If you are sick with COVID-19, avoid contact with your pet, including petting, snuggling, being kissed or licked, and sharing food.  If you must care for your pet or be around animals while you are sick, wash your hands before and after you interact with pets and wear a facemask. Call ahead before visiting your doctor  If you have a medical appointment, call the healthcare provider and tell them that you have or may have COVID-19. This will help the healthcare providers office take steps to keep other people from getting infected or exposed. Wear a facemask  You should wear a facemask when you are around other people (e.g., sharing a room or vehicle) or pets and before you enter a healthcare providers office. If you are not able to wear a facemask (for example, because it causes trouble breathing), then people who live with you should not stay in the same room with you, or they should wear a facemask if they enter your room. Cover your coughs and sneezes  Cover your mouth and nose with a tissue when you cough or sneeze. Throw used tissues in a lined trash can. Immediately wash your hands with soap and water for at least 20 seconds or, if soap and water are not available, clean your hands with an alcohol-based hand  that contains at least 60% alcohol. Clean your hands often  Wash your hands often with soap and water for at least 20 seconds, especially after blowing your nose, coughing, or sneezing; going to the bathroom; and before eating or preparing food. If soap and water are not readily available, use an alcohol-based hand  with at least 60% alcohol, covering all surfaces of your hands and rubbing them together until they feel dry. Soap and water are the best option if hands are visibly dirty. Avoid touching your eyes, nose, and mouth with unwashed hands. Avoid sharing personal household items  You should not share dishes, drinking glasses, cups, eating utensils, towels, or bedding with other people or pets in your home. After using these items, they should be washed thoroughly with soap and water.   Clean all high-touch surfaces everyday  High touch surfaces include counters, tabletops, doorknobs, bathroom fixtures, toilets, phones, keyboards, tablets, and bedside tables. Also, clean any surfaces that may have blood, stool, or body fluids on them. Use a household cleaning spray or wipe, according to the label instructions. Labels contain instructions for safe and effective use of the cleaning product including precautions you should take when applying the product, such as wearing gloves and making sure you have good ventilation during use of the product. Monitor your symptoms  Seek prompt medical attention if your illness is worsening (e.g., difficulty breathing). Before seeking care, call your healthcare provider and tell them that you have, or are being evaluated for, COVID-19. Put on a facemask before you enter the facility. These steps will help the healthcare providers office to keep other people in the office or waiting room from getting infected or exposed. Ask your healthcare provider to call the local or state health department. Persons who are placed under active monitoring or facilitated self-monitoring should follow instructions provided by their local health department or occupational health professionals, as appropriate. When working with your local health department check their available hours. If you have a medical emergency and need to call 911, notify the dispatch personnel that you have, or are being evaluated for COVID-19. If possible, put on a facemask before emergency medical services arrive. Discontinuing home isolation  Patients with confirmed COVID-19 should remain under home isolation precautions until the risk of secondary transmission to others is thought to be low. The decision to discontinue home isolation precautions should be made on a case-by-case basis, in consultation with healthcare providers and state and local health departments. Problem List     None           Patient given educationalmaterials - see patient instructions.   Discussed use,

## 2020-08-05 LAB — SARS-COV-2, NAA: DETECTED

## 2020-08-06 ENCOUNTER — TELEPHONE (OUTPATIENT)
Dept: ADMINISTRATIVE | Age: 60
End: 2020-08-06

## 2020-08-08 ENCOUNTER — TELEPHONE (OUTPATIENT)
Dept: PRIMARY CARE CLINIC | Age: 60
End: 2020-08-08

## 2020-08-08 NOTE — TELEPHONE ENCOUNTER
Patient was seen on 8/2 and was positive for covid. States he has a really bad cough and its getting worse. No SOB or chest tightness. Got tessalon but thinks he might need an oral steroid. Please advise.  Thank you

## 2020-08-10 RX ORDER — PREDNISONE 20 MG/1
40 TABLET ORAL DAILY
Qty: 10 TABLET | Refills: 0 | Status: SHIPPED | OUTPATIENT
Start: 2020-08-10 | End: 2020-08-15

## 2021-01-08 DIAGNOSIS — F43.9 STRESS: ICD-10-CM

## 2021-01-08 NOTE — TELEPHONE ENCOUNTER
Last OV 07/01/2020    Next OV LVM for pt to contact office to make appt    Health Maintenance   Topic Date Due    DTaP/Tdap/Td vaccine (1 - Tdap) 04/13/1979    Shingles Vaccine (1 of 2) 04/13/2010    Colon cancer screen colonoscopy  04/15/2019    Flu vaccine (1) 09/01/2020    A1C test (Diabetic or Prediabetic)  01/21/2021 (Originally 3/6/2019)    Lipid screen  02/17/2025    Hepatitis C screen  Completed    HIV screen  Completed    Hepatitis A vaccine  Aged Out    Hepatitis B vaccine  Aged Out    Hib vaccine  Aged Out    Meningococcal (ACWY) vaccine  Aged Out    Pneumococcal 0-64 years Vaccine  Aged Out             (applicable per patient's age: Cancer Screenings, Depression Screening, Fall Risk Screening, Immunizations)    Hemoglobin A1C (%)   Date Value   03/06/2018 5.7     LDL Cholesterol (mg/dL)   Date Value   02/17/2020          AST (U/L)   Date Value   02/17/2020 28     ALT (U/L)   Date Value   02/17/2020 46 (H)     BUN (mg/dL)   Date Value   02/17/2020 19      (goal A1C is < 7)   (goal LDL is <100) need 30-50% reduction from baseline     BP Readings from Last 3 Encounters:   01/31/20 (!) 147/85   01/20/20 (!) 150/88   11/26/18 (!) 142/84    (goal /80)      All Future Testing planned in [facility]:  Lab Frequency Next Occurrence   Hepatic Function Panel Once 01/11/2021   Hemoglobin A1C Once 01/11/2021   Lipid Panel Once 01/11/2021       Next Visit Date:  No future appointments.          Patient Active Problem List:     Stress     Adenomatous polyp     Hemorrhoids     Diverticulosis     Impaired fasting glucose     High cholesterol     Left wrist pain     Trigger middle finger of left hand

## 2021-01-22 ENCOUNTER — TELEPHONE (OUTPATIENT)
Dept: PRIMARY CARE CLINIC | Age: 61
End: 2021-01-22

## 2021-01-22 ENCOUNTER — OFFICE VISIT (OUTPATIENT)
Dept: PRIMARY CARE CLINIC | Age: 61
End: 2021-01-22
Payer: COMMERCIAL

## 2021-01-22 VITALS
WEIGHT: 208.2 LBS | DIASTOLIC BLOOD PRESSURE: 82 MMHG | BODY MASS INDEX: 29.87 KG/M2 | HEART RATE: 81 BPM | OXYGEN SATURATION: 97 % | SYSTOLIC BLOOD PRESSURE: 130 MMHG

## 2021-01-22 DIAGNOSIS — Z23 NEED FOR INFLUENZA VACCINATION: ICD-10-CM

## 2021-01-22 DIAGNOSIS — R22.1 LUMP ON NECK: Primary | ICD-10-CM

## 2021-01-22 DIAGNOSIS — L08.9 SKIN INFECTION: Primary | ICD-10-CM

## 2021-01-22 DIAGNOSIS — Z13.1 SCREENING FOR DIABETES MELLITUS: ICD-10-CM

## 2021-01-22 DIAGNOSIS — J34.89 NASAL MUCOSA DRY: ICD-10-CM

## 2021-01-22 DIAGNOSIS — E78.00 HIGH CHOLESTEROL: ICD-10-CM

## 2021-01-22 DIAGNOSIS — Z12.11 COLON CANCER SCREENING: ICD-10-CM

## 2021-01-22 DIAGNOSIS — M79.89 SOFT TISSUE MASS: ICD-10-CM

## 2021-01-22 DIAGNOSIS — Z13.0 SCREENING, ANEMIA, DEFICIENCY, IRON: ICD-10-CM

## 2021-01-22 DIAGNOSIS — Z86.010 HISTORY OF COLON POLYPS: ICD-10-CM

## 2021-01-22 PROCEDURE — 90686 IIV4 VACC NO PRSV 0.5 ML IM: CPT | Performed by: NURSE PRACTITIONER

## 2021-01-22 PROCEDURE — 90471 IMMUNIZATION ADMIN: CPT | Performed by: NURSE PRACTITIONER

## 2021-01-22 PROCEDURE — 99214 OFFICE O/P EST MOD 30 MIN: CPT | Performed by: NURSE PRACTITIONER

## 2021-01-22 RX ORDER — CEPHALEXIN 500 MG/1
500 CAPSULE ORAL 2 TIMES DAILY
Qty: 14 CAPSULE | Refills: 0 | Status: SHIPPED | OUTPATIENT
Start: 2021-01-22 | End: 2021-01-22 | Stop reason: SDUPTHER

## 2021-01-22 RX ORDER — SODIUM CHLORIDE/ALOE VERA
GEL (GRAM) NASAL PRN
Qty: 1 TUBE | Refills: 3 | Status: SHIPPED | OUTPATIENT
Start: 2021-01-22 | End: 2021-02-01

## 2021-01-22 RX ORDER — CEPHALEXIN 500 MG/1
500 CAPSULE ORAL 2 TIMES DAILY
Qty: 14 CAPSULE | Refills: 0 | Status: SHIPPED | OUTPATIENT
Start: 2021-01-22 | End: 2021-01-29

## 2021-01-22 ASSESSMENT — ENCOUNTER SYMPTOMS
RHINORRHEA: 0
VOMITING: 0
SHORTNESS OF BREATH: 0
COLOR CHANGE: 0
SORE THROAT: 0
ABDOMINAL PAIN: 0
CHEST TIGHTNESS: 0
DIARRHEA: 0
NAUSEA: 0

## 2021-01-22 ASSESSMENT — PATIENT HEALTH QUESTIONNAIRE - PHQ9
1. LITTLE INTEREST OR PLEASURE IN DOING THINGS: 0
SUM OF ALL RESPONSES TO PHQ QUESTIONS 1-9: 0
SUM OF ALL RESPONSES TO PHQ QUESTIONS 1-9: 0

## 2021-01-22 NOTE — PROGRESS NOTES
ADENOMATOUS POLYP, DIVERTICULOSIS, INTERNAL AND EXTERNAL HEMORRHOIDS     FINGER TRIGGER RELEASE Right     KNEE SURGERY Right     NECK SURGERY N/A 2020    EXCISION SUBCUTANEOUS LESION NECK performed by Estephania Madera MD at [facility]  2020    EXCISION SUBCUTANEOUS LESION NECK (    VASECTOMY         Family History   Problem Relation Age of Onset    Cancer Maternal Grandfather        Social History     Tobacco Use    Smoking status: Former Smoker     Packs/day: 0.25     Years: 15.00     Pack years: 3.75     Quit date: 2010     Years since quittin.0    Smokeless tobacco: Never Used   Substance Use Topics    Alcohol use: Yes     Comment: rarely      Current Outpatient Medications   Medication Sig Dispense Refill    saline nasal gel (AYR) GEL by Nasal route as needed for Congestion 1 Tube 3    sertraline (ZOLOFT) 50 MG tablet TAKE 1 TABLET BY MOUTH ONE TIME A DAY 90 tablet 1    Omega-3 Fatty Acids (FISH OIL) 1000 MG CAPS Take 1,000 mg by mouth daily      Multiple Vitamin (MULTI-VITAMINS PO) Take by mouth       No current facility-administered medications for this visit. No Known Allergies    Health Maintenance   Topic Date Due    DTaP/Tdap/Td vaccine (1 - Tdap) 1979    Shingles Vaccine (1 of 2) 2010    A1C test (Diabetic or Prediabetic)  2019    Colon cancer screen colonoscopy  04/15/2019    Lipid screen  2025    Flu vaccine  Completed    Hepatitis C screen  Completed    HIV screen  Completed    Hepatitis A vaccine  Aged Out    Hepatitis B vaccine  Aged Out    Hib vaccine  Aged Out    Meningococcal (ACWY) vaccine  Aged Out    Pneumococcal 0-64 years Vaccine  Aged Out       Subjective:      Review of Systems   Constitutional: Negative for activity change, fatigue and fever. HENT: Negative for congestion, rhinorrhea and sore throat. Eyes: Negative for visual disturbance. Respiratory: Negative for chest tightness and shortness of breath. Cardiovascular: Negative for chest pain and palpitations. Gastrointestinal: Negative for abdominal pain, diarrhea, nausea and vomiting. Endocrine: Negative for polydipsia. Genitourinary: Negative for difficulty urinating. Musculoskeletal: Positive for neck pain (lump on neck). Negative for arthralgias and myalgias. Skin: Negative for color change. Neurological: Negative for weakness and headaches. Psychiatric/Behavioral: Negative for behavioral problems. The patient is not nervous/anxious. All other systems reviewed and are negative. Objective:   /82 (Site: Left Upper Arm, Position: Sitting)   Pulse 81   Wt 208 lb 3.2 oz (94.4 kg)   SpO2 97%   BMI 29.87 kg/m²   Physical Exam  Vitals signs reviewed. Constitutional:       General: He is not in acute distress. Appearance: Normal appearance. HENT:      Head: Normocephalic. Eyes:      Pupils: Pupils are equal, round, and reactive to light. Cardiovascular:      Rate and Rhythm: Normal rate and regular rhythm. Pulses: Normal pulses. Heart sounds: Normal heart sounds. Pulmonary:      Effort: Pulmonary effort is normal.      Breath sounds: Normal breath sounds. Abdominal:      General: There is no distension. Musculoskeletal: Normal range of motion. Right lower leg: No edema. Left lower leg: No edema. Skin:     General: Skin is warm and dry. Capillary Refill: Capillary refill takes less than 2 seconds. Comments: Red, swollen mass about 1cm in diameter to posterior neck at base of hairline, firm but movable, no drainage or bleeding   Neurological:      General: No focal deficit present. Mental Status: He is alert and oriented to person, place, and time.    Psychiatric:         Mood and Affect: Mood normal.         Behavior: Behavior normal.           :       Diagnosis Orders Patient given educational materials - see patient instructions. Discussed use, benefit, and side effects of prescribed medications. All patient questions answered. Pt voiced understanding. Reviewed health maintenance. Instructed to continue current medications, diet and exercise. Patient agreed with treatment plan. Follow up as directed.        Electronicallysigned by BRIONNA Woodard CNP on 1/22/2021 at 11:51 AM

## 2021-01-22 NOTE — PROGRESS NOTES
Vaccine Information Sheet, \"Influenza - Inactivated\"  given to Roxanne Hook, or parent/legal guardian of  Roxanne Hook and verbalized understanding. Patient responses:    Have you ever had a reaction to a flu vaccine? No  Are you able to eat eggs without adverse effects? Yes  Do you have any current illness? No  Have you ever had Guillian Ludell Syndrome? No    Flu vaccine given per order. Please see immunization tab.

## 2021-01-25 ENCOUNTER — OFFICE VISIT (OUTPATIENT)
Dept: SURGERY | Age: 61
End: 2021-01-25
Payer: COMMERCIAL

## 2021-01-25 VITALS
BODY MASS INDEX: 29.92 KG/M2 | SYSTOLIC BLOOD PRESSURE: 137 MMHG | OXYGEN SATURATION: 95 % | HEART RATE: 74 BPM | WEIGHT: 209 LBS | HEIGHT: 70 IN | DIASTOLIC BLOOD PRESSURE: 87 MMHG

## 2021-01-25 DIAGNOSIS — D36.9 ADENOMATOUS POLYP: Primary | ICD-10-CM

## 2021-01-25 DIAGNOSIS — L72.0 EPIDERMOID CYST OF NECK: ICD-10-CM

## 2021-01-25 PROCEDURE — 99203 OFFICE O/P NEW LOW 30 MIN: CPT | Performed by: SURGERY

## 2021-01-25 NOTE — LETTER
[address]  [facility]  Phone: [phone]  Fax: [phone]      21    Patient: Ruba Farfan  MRN: [id number]  : 1960  Date of visit: 2021    Dear BRIONNA Hammond - CNP:      Thank you for requesting consultation for Ruba Farfan in regards to evaluation for neck cyst and history of colon adenomatous polyp. Below are the relevant portions of my assessment and plan of care. If you have questions, please do not hesitate to call me. I look forward to following Ruba Farfan along with you.     Sincerely,        Katerina Hopper, DO

## 2021-01-25 NOTE — PROGRESS NOTES
[facility] General Surgery   History & Physical  Demario Atkins DO  Pt Name: Chuck Carlson  MRN: [id number]  YOB: 1960  Date of evaluation: 1/25/2021  Primary Care Physician: BRIONNA Weaver CNP    Chief Complaint:   Chief Complaint   Patient presents with    New Patient     Patient states he has a \"bump\" on the back of his neck. SUBJECTIVE:    History of Present Illness: This is a 61 y.o.  male who presents for evaluation for two issues. Pt reports mass on back of neck enlarged over the past several weeks, denies any history of infection or drainage but reports it is uncomfortable and would like to have this removed. Pt also with history of adenomatous polyp removed by colonoscopy in 2016 and in need of repeat colonoscopy. No other complaints at this time. Past Medical History   has a past medical history of Adenomatous polyp, Diverticulosis, Hemorrhoids, and High cholesterol. Past Surgical History   has a past surgical history that includes Finger trigger release (Right); knee surgery (Right); Vasectomy; Colonoscopy (04-15-16); other surgical history (01/31/2020); and Neck surgery (N/A, 1/31/2020). Family History  family history includes Cancer in his maternal grandfather. Social History  Tobacco use:  reports that he quit smoking about 11 years ago. He has a 3.75 pack-year smoking history. He has never used smokeless tobacco.  Alcohol use:  reports current alcohol use. Drug use:  reports no history of drug use.       Medications  Current Medications:   Current Outpatient Medications   Medication Sig Dispense Refill    cephALEXin (KEFLEX) 500 MG capsule Take 1 capsule by mouth 2 times daily for 7 days 14 capsule 0    sertraline (ZOLOFT) 50 MG tablet TAKE 1 TABLET BY MOUTH ONE TIME A DAY 90 tablet 1    Omega-3 Fatty Acids (FISH OIL) 1000 MG CAPS Take 1,000 mg by mouth daily      Multiple Vitamin (MULTI-VITAMINS PO) Take by mouth  saline nasal gel (AYR) GEL by Nasal route as needed for Congestion (Patient not taking: Reported on 1/25/2021) 1 Tube 3     No current facility-administered medications for this visit. Home Medications:   Prior to Admission medications    Medication Sig Start Date End Date Taking? Authorizing Provider   cephALEXin (KEFLEX) 500 MG capsule Take 1 capsule by mouth 2 times daily for 7 days 1/22/21 1/29/21 Yes BRIONNA Yousif CNP   sertraline (ZOLOFT) 50 MG tablet TAKE 1 TABLET BY MOUTH ONE TIME A DAY 1/8/21  Yes BRIONAN Yousif CNP   Omega-3 Fatty Acids (FISH OIL) 1000 MG CAPS Take 1,000 mg by mouth daily   Yes Historical Provider, MD   Multiple Vitamin (MULTI-VITAMINS PO) Take by mouth   Yes Historical Provider, MD   saline nasal gel (AYR) GEL by Nasal route as needed for Congestion  Patient not taking: Reported on 1/25/2021 1/22/21   BRIONNA Clinton CNP       Allergies  Patient has no known allergies. Review of Systems:  General: Denies any fever, chills. Eyes: Denies any changes in vision, diplopia or eye pain  Ears, Nose, Mouth: Denies changes in hearing/tinnitus or drainage from ears, no rhinorrhea or bloody nose, no difficulty chewing  Throat: no difficulty swallowing, no throat pain  Respiratory: Denies any shortness of breath or cough. Cardiac: Denies any chest pain, palpitations, claudication or edema. Gastrointestinal: Denies any melena, hematochezia, hematemesis or pyrosis. Genitourinary: Denies any frequency, urgency, hesitancy or incontinence. Musculoskeletal: Denies worsening muscle weakness or recent trauma  Skin: Denies rashes or lesions  Psychiatric: Denies any recent changes in mood or affect  Hematologic: Denies bruising or bleeding easily.     PHYSICAL EXAMINATION  Vitals:   Vitals:    01/25/21 1022   BP: 137/87   Pulse:    SpO2:        General Appearance:  awake, alert, no acute distress, well developed, well nourished Skin:  2.5cm subcutaneous mass with smooth borders consistent with epidermoid cyst without evidence of bleeding/infection. Head/face:  NCAT, face symmetrical  Eyes:  PERRL, no evidence of conjunctivitis or ptosis bilaterally  Ears:  External ears and canals grossly normal, no evidence of otorrhea. Nose/Sinuses:  Nares normal. Septum midline. Mucosa normal. No external drainage noted. Mouth/Neck:  Mucosa moist.  No external oral lesions. Trachea midline. No visible masses. Lungs:  Normal chest expansion, unlabored breathing without accessory muscle use. No audible rales, rhonchi, or wheezing. Cardiovascular: S1S2. No evidence of JVD. No evidence of pulsatile masses in abdomen  Abdomen:  Soft, non-tender, no organomegaly, no masses. Musculoskeletal: No evidence of bony/muscular deformities, trauma, atrophy of either left/right upper/lower extremity. No evidence of digital clubbing or cyanosis. Neurologic:  CN 2-12 grossly intact without obvious deficits. Grossly normal sensation in all extremities. Psychiatric: appropriate judgement and insight, appropriate recall of recent and remote memory, no evidence of depression/anxiety/agitation      DIAGNOSES:   Diagnosis Orders   1. Adenomatous polyp     2. Epidermoid cyst of neck         PLAN:  · Schedule for surveillance colonoscopy and excision of neck cyst under MAC. The risks include bleeding, infection, scarring, perforation, damage to surrounding tissues, need for further surgery in the future. The benefits, alternatives, complications and procedure details were explained to the patient, all questions were answered.   ·       Medical Decision Making: low complexity     Electronically signed by Kacie De Leon DO on 1/25/2021 at 10:40 AM

## 2021-01-27 ENCOUNTER — TELEPHONE (OUTPATIENT)
Dept: GASTROENTEROLOGY | Age: 61
End: 2021-01-27

## 2021-01-27 NOTE — TELEPHONE ENCOUNTER
1st attempt; called and LVM for pt regarding colon screen    2nd attempt; mailed colon screen letter to pt's home address.

## 2021-01-27 NOTE — TELEPHONE ENCOUNTER
Pt called back and states he is already scheduled for colon with Dr Barbra Leung office. Sending referral back to provider.

## 2021-02-01 NOTE — PROGRESS NOTES
DAY OF SURGERY/PROCEDURE  GUIDELINES    As a patient at the [facility] you can expect quality medical and nursing care that is centered on your individual needs. It is our goal to make your surgical experience as comfortable and excellent as possible.  ________________________________________________________________________    The following instructions are general guidelines, if any information on this sheet is different from what your doctor has instructed you to do, please follow your doctor's instructions. · Please arrive on time or your procedure may be rescheduled. · Enter through front entrance C of the building. · Upon arrival you will be taken to the pre-operative area to get ready for surgery  · You will be given a specific time when you will NOT be able to eat, drink, smoke, suck or chew ANYTHING (no water, gum, mints, cigarettes, cigars, pipes, snuff, chewing tobacco, etc.) or your surgery may be canceled. This will occur during your PAT appointment or by phone 1-2 days before surgery  · Take a shower or bath on the morning of your surgery/procedure (Hibiclens if directed)  · Brush your teeth, but do not swallow any water  · IN CASE OF ILLNESS - If you have a cold or flu symptoms (high fever, runny nose, sore throat, cough, etc.) rash, nausea, vomiting, loose stools, and/or recent contact with someone who has a contagious disease (chick pox, measles, etc.) please call your doctor before coming to the surgery center  · Take a small sip of water with heart, blood pressure, and/or seizure medication the morning of surgery.  (DO NOT take blood pressure medications that contain a diuretic)  · If applicable bring your:  · Inhaler (s)  · Hearing aid(s)  · Eyeglasses and Case (If you wear contacts they have to be removed before surgery, bring case and solution)  · Any paperwork given to you by your doctor  · Any X-rays you were told to bring · A copy of your Living Will or Durable Power of     · DO NOT take anticoagulants (blood thinners, aspiring or aspirin-containing products) two weeks prior to your surgery. · DO NOT take any diabetic pills or insulin. Please bring your sliding scale instructions and sick day plan with you. If you have a low blood sugar reaction after midnight, drink 4 ounces of apple juice or regular pop. · Wear loose, comfortable clothing that is easy to put on and take off. · You will be returning home the same day as your surgery, you will need to have a responsible adult (25years of age or older) present to drive you home. You will need someone to stay with you at home for the first 24 hours following your surgery. This is due to the anesthesia and the medications given to you during surgery and recovery. · Your doctor may talk with your family immediately following your procedure. Depending on your needs, you will stay in the recovery room between 30 minutes to 2 hours. · While you are recovering, the surgery family waiting room  can answer many of your family's questions. All medically related questions will be answered by a medical professional. If you had outpatient surgery, you will meet your family for discharge instructions before leaving.

## 2021-02-04 ENCOUNTER — OFFICE VISIT (OUTPATIENT)
Dept: SURGERY | Age: 61
End: 2021-02-04
Payer: COMMERCIAL

## 2021-02-04 VITALS — WEIGHT: 208.8 LBS | BODY MASS INDEX: 29.89 KG/M2 | HEIGHT: 70 IN

## 2021-02-04 DIAGNOSIS — L72.0 INFECTED EPIDERMOID CYST: Primary | ICD-10-CM

## 2021-02-04 DIAGNOSIS — L08.9 INFECTED EPIDERMOID CYST: Primary | ICD-10-CM

## 2021-02-04 PROCEDURE — 10060 I&D ABSCESS SIMPLE/SINGLE: CPT | Performed by: SURGERY

## 2021-02-04 RX ORDER — LIDOCAINE HYDROCHLORIDE AND EPINEPHRINE 10; 10 MG/ML; UG/ML
10 INJECTION, SOLUTION INFILTRATION; PERINEURAL ONCE
Status: COMPLETED | OUTPATIENT
Start: 2021-02-04 | End: 2021-02-04

## 2021-02-04 RX ORDER — SULFAMETHOXAZOLE AND TRIMETHOPRIM 800; 160 MG/1; MG/1
1 TABLET ORAL 2 TIMES DAILY
Qty: 14 TABLET | Refills: 0 | Status: SHIPPED | OUTPATIENT
Start: 2021-02-04 | End: 2021-02-11

## 2021-02-04 RX ADMIN — LIDOCAINE HYDROCHLORIDE AND EPINEPHRINE 10 ML: 10; 10 INJECTION, SOLUTION INFILTRATION; PERINEURAL at 10:12

## 2021-02-04 NOTE — LETTER
[facility] Gen Surg  [address]  Phone: [phone]  Fax: [phone]      21    Patient: Todd Rangel  MRN: [id number]  : 1960  Date of visit: 2021    Dear BRIONNA Marin - CNP:      I saw Todd Rangel in clinic, his cyst was showing sings of infection and was incised/drained. Below are the relevant portions of my assessment and plan of care. If you have questions, please do not hesitate to call me. I look forward to following Todd Rangel along with you.     Sincerely,        Leory Powell, DO

## 2021-02-04 NOTE — PROGRESS NOTES
[facility]  Progress Note    PATIENT NAME: Forbes Severin     CLINIC VISIT DATE: 2/4/2021    SUBJECTIVE:  Forbes Severin is a 61 y.o. male who presents to the clinic today for evaluation after contacting the clinic complaining of worsening pressure and pain at the site of cyst.       OBJECTIVE:    Ht 5' 10\" (1.778 m)   Wt 208 lb 12.8 oz (94.7 kg)   BMI 29.96 kg/m²     General Appearance:  awake, alert, no acute distress, well developed, well nourished   Skin:  Slightly increased erythema and new area of fluctuance distal to the previously identified dermoid cyst  Lungs:  Normal chest expansion, unlabored breathing without accessory muscle use. No audible rales, rhonchi, or wheezing. Cardiovascular: S1S2. No evidence of JVD. No evidence of pulsatile masses in abdomen  Abdomen:  Soft, non-tender, no organomegaly, no masses. Musculoskeletal: No evidence of bony/muscular deformities, trauma, atrophy of either left/right upper/lower extremity. No evidence of digital clubbing or cyanosis. PROCEDURE NOTE    DATE OF PROCEDURE: 2/4/21      SURGEON: Dr. Naresh Mohr DIAGNOSIS : infected dermoid cyst requiring procedural drainage    POSTOPERATIVE DIAGNOSIS: Same     PROCEDURE: incision and drainage of cyst    ANESTHESIA: Local anesthesia with 3cc 1% lidocaine w/ epinephrine    ESTIMATED BLOOD LOSS:  Less than 2cc    COMPLICATIONS: None. SPECIMENS:  none    WOUND CLASS 3    OPERATIVE DETAIL:  The patient was placed on procedure table in prone position. Timeout was performed verifying correct patient, position, equipment and procedure to be performed. Consent obtained for incision/drainage. Area cleansed with betadine and locally anesthetized with 1% lidocaine with epinephrine. Abscess incised with scalpel. Cavity explored with hemostats. No loculations found. There was a generous amount of purulent material expressed, the cavity was irrigated. The overlying thin skin was excised. Hemostasis achieved and maintained with direct manual compression. The skin defect was closed with suture of 3-0 nylon then covered with bacitracin and bandage. All sponge needle and instrument counts correct at the end of the case. Pt tolerated procedure well without issue. ASSESSMENT:  1. Infected epidermoid cyst          PLAN:  1. Wash twice daily with soap and water and cover with bacitracin/bandage  2. F/U at scheduled colonoscopy, wound check at that time  3.  Bactrim DS for 7d for infected cyst    Electronically signed by Pavel Waters DO on 2/4/2021 at 9:21 AM

## 2021-02-09 ENCOUNTER — HOSPITAL ENCOUNTER (OUTPATIENT)
Age: 61
Discharge: HOME | End: 2021-02-09
Payer: COMMERCIAL

## 2021-02-09 PROCEDURE — 93005 ELECTROCARDIOGRAM TRACING: CPT | Performed by: ANESTHESIOLOGY

## 2021-02-10 ENCOUNTER — ANESTHESIA EVENT (OUTPATIENT)
Dept: OPERATING ROOM | Age: 61
End: 2021-02-10
Payer: COMMERCIAL

## 2021-02-10 LAB
EKG ATRIAL RATE: 91 BPM
EKG P AXIS: 42 DEGREES
EKG P-R INTERVAL: 144 MS
EKG Q-T INTERVAL: 374 MS
EKG QRS DURATION: 114 MS
EKG QTC CALCULATION (BAZETT): 460 MS
EKG R AXIS: -32 DEGREES
EKG T AXIS: 30 DEGREES
EKG VENTRICULAR RATE: 91 BPM

## 2021-02-10 PROCEDURE — 93010 ELECTROCARDIOGRAM REPORT: CPT | Performed by: INTERNAL MEDICINE

## 2021-02-12 ENCOUNTER — ANESTHESIA (OUTPATIENT)
Dept: OPERATING ROOM | Age: 61
End: 2021-02-12
Payer: COMMERCIAL

## 2021-02-12 ENCOUNTER — HOSPITAL ENCOUNTER (OUTPATIENT)
Age: 61
Setting detail: OUTPATIENT SURGERY
Discharge: HOME | End: 2021-02-12
Attending: SURGERY | Admitting: SURGERY
Payer: COMMERCIAL

## 2021-02-12 VITALS
HEART RATE: 75 BPM | DIASTOLIC BLOOD PRESSURE: 93 MMHG | OXYGEN SATURATION: 97 % | HEIGHT: 70 IN | BODY MASS INDEX: 29.22 KG/M2 | WEIGHT: 204.13 LBS | RESPIRATION RATE: 20 BRPM | SYSTOLIC BLOOD PRESSURE: 113 MMHG | TEMPERATURE: 98.2 F

## 2021-02-12 VITALS
DIASTOLIC BLOOD PRESSURE: 58 MMHG | SYSTOLIC BLOOD PRESSURE: 106 MMHG | RESPIRATION RATE: 21 BRPM | OXYGEN SATURATION: 95 %

## 2021-02-12 DIAGNOSIS — Z01.818 PRE-OP TESTING: Primary | ICD-10-CM

## 2021-02-12 PROCEDURE — 3609027000 HC COLONOSCOPY: Performed by: SURGERY

## 2021-02-12 PROCEDURE — 7100000000 HC PACU RECOVERY - FIRST 15 MIN: Performed by: SURGERY

## 2021-02-12 PROCEDURE — 7100000010 HC PHASE II RECOVERY - FIRST 15 MIN: Performed by: SURGERY

## 2021-02-12 PROCEDURE — 6360000002 HC RX W HCPCS: Performed by: NURSE ANESTHETIST, CERTIFIED REGISTERED

## 2021-02-12 PROCEDURE — 2580000003 HC RX 258: Performed by: NURSE ANESTHETIST, CERTIFIED REGISTERED

## 2021-02-12 PROCEDURE — 7100000011 HC PHASE II RECOVERY - ADDTL 15 MIN: Performed by: SURGERY

## 2021-02-12 PROCEDURE — 2500000003 HC RX 250 WO HCPCS: Performed by: NURSE ANESTHETIST, CERTIFIED REGISTERED

## 2021-02-12 PROCEDURE — 2709999900 HC NON-CHARGEABLE SUPPLY: Performed by: SURGERY

## 2021-02-12 PROCEDURE — 45378 DIAGNOSTIC COLONOSCOPY: CPT | Performed by: SURGERY

## 2021-02-12 PROCEDURE — 3700000000 HC ANESTHESIA ATTENDED CARE: Performed by: SURGERY

## 2021-02-12 PROCEDURE — 3700000001 HC ADD 15 MINUTES (ANESTHESIA): Performed by: SURGERY

## 2021-02-12 RX ORDER — PROPOFOL 10 MG/ML
INJECTION, EMULSION INTRAVENOUS PRN
Status: DISCONTINUED | OUTPATIENT
Start: 2021-02-12 | End: 2021-02-12 | Stop reason: SDUPTHER

## 2021-02-12 RX ORDER — PROMETHAZINE HYDROCHLORIDE 25 MG/ML
6.25 INJECTION, SOLUTION INTRAMUSCULAR; INTRAVENOUS
Status: DISCONTINUED | OUTPATIENT
Start: 2021-02-12 | End: 2021-02-12 | Stop reason: HOSPADM

## 2021-02-12 RX ORDER — LIDOCAINE HYDROCHLORIDE 10 MG/ML
INJECTION, SOLUTION EPIDURAL; INFILTRATION; INTRACAUDAL; PERINEURAL PRN
Status: DISCONTINUED | OUTPATIENT
Start: 2021-02-12 | End: 2021-02-12 | Stop reason: SDUPTHER

## 2021-02-12 RX ORDER — SODIUM CHLORIDE 0.9 % (FLUSH) 0.9 %
10 SYRINGE (ML) INJECTION PRN
Status: DISCONTINUED | OUTPATIENT
Start: 2021-02-12 | End: 2021-02-12 | Stop reason: HOSPADM

## 2021-02-12 RX ORDER — MEPERIDINE HYDROCHLORIDE 50 MG/ML
12.5 INJECTION INTRAMUSCULAR; INTRAVENOUS; SUBCUTANEOUS EVERY 5 MIN PRN
Status: DISCONTINUED | OUTPATIENT
Start: 2021-02-12 | End: 2021-02-12 | Stop reason: HOSPADM

## 2021-02-12 RX ORDER — FENTANYL CITRATE 50 UG/ML
25 INJECTION, SOLUTION INTRAMUSCULAR; INTRAVENOUS EVERY 5 MIN PRN
Status: DISCONTINUED | OUTPATIENT
Start: 2021-02-12 | End: 2021-02-12 | Stop reason: HOSPADM

## 2021-02-12 RX ORDER — MORPHINE SULFATE 1 MG/ML
1 INJECTION, SOLUTION EPIDURAL; INTRATHECAL; INTRAVENOUS EVERY 5 MIN PRN
Status: DISCONTINUED | OUTPATIENT
Start: 2021-02-12 | End: 2021-02-12 | Stop reason: HOSPADM

## 2021-02-12 RX ORDER — SODIUM CHLORIDE, SODIUM LACTATE, POTASSIUM CHLORIDE, CALCIUM CHLORIDE 600; 310; 30; 20 MG/100ML; MG/100ML; MG/100ML; MG/100ML
INJECTION, SOLUTION INTRAVENOUS CONTINUOUS PRN
Status: DISCONTINUED | OUTPATIENT
Start: 2021-02-12 | End: 2021-02-12 | Stop reason: SDUPTHER

## 2021-02-12 RX ORDER — DIPHENHYDRAMINE HYDROCHLORIDE 50 MG/ML
12.5 INJECTION INTRAMUSCULAR; INTRAVENOUS
Status: DISCONTINUED | OUTPATIENT
Start: 2021-02-12 | End: 2021-02-12 | Stop reason: HOSPADM

## 2021-02-12 RX ORDER — ONDANSETRON 2 MG/ML
4 INJECTION INTRAMUSCULAR; INTRAVENOUS
Status: DISCONTINUED | OUTPATIENT
Start: 2021-02-12 | End: 2021-02-12 | Stop reason: HOSPADM

## 2021-02-12 RX ORDER — HYDROCODONE BITARTRATE AND ACETAMINOPHEN 5; 325 MG/1; MG/1
1 TABLET ORAL PRN
Status: DISCONTINUED | OUTPATIENT
Start: 2021-02-12 | End: 2021-02-12 | Stop reason: HOSPADM

## 2021-02-12 RX ORDER — HYDROCODONE BITARTRATE AND ACETAMINOPHEN 5; 325 MG/1; MG/1
2 TABLET ORAL PRN
Status: DISCONTINUED | OUTPATIENT
Start: 2021-02-12 | End: 2021-02-12 | Stop reason: HOSPADM

## 2021-02-12 RX ORDER — HYDRALAZINE HYDROCHLORIDE 20 MG/ML
5 INJECTION INTRAMUSCULAR; INTRAVENOUS EVERY 10 MIN PRN
Status: DISCONTINUED | OUTPATIENT
Start: 2021-02-12 | End: 2021-02-12 | Stop reason: HOSPADM

## 2021-02-12 RX ORDER — SODIUM CHLORIDE 0.9 % (FLUSH) 0.9 %
10 SYRINGE (ML) INJECTION EVERY 12 HOURS SCHEDULED
Status: DISCONTINUED | OUTPATIENT
Start: 2021-02-12 | End: 2021-02-12 | Stop reason: HOSPADM

## 2021-02-12 RX ADMIN — PROPOFOL 50 MG: 10 INJECTION, EMULSION INTRAVENOUS at 13:12

## 2021-02-12 RX ADMIN — PROPOFOL 50 MG: 10 INJECTION, EMULSION INTRAVENOUS at 13:29

## 2021-02-12 RX ADMIN — PROPOFOL 50 MG: 10 INJECTION, EMULSION INTRAVENOUS at 13:27

## 2021-02-12 RX ADMIN — PROPOFOL 50 MG: 10 INJECTION, EMULSION INTRAVENOUS at 13:19

## 2021-02-12 RX ADMIN — PROPOFOL 50 MG: 10 INJECTION, EMULSION INTRAVENOUS at 13:25

## 2021-02-12 RX ADMIN — PROPOFOL 50 MG: 10 INJECTION, EMULSION INTRAVENOUS at 13:23

## 2021-02-12 RX ADMIN — SODIUM CHLORIDE, POTASSIUM CHLORIDE, SODIUM LACTATE AND CALCIUM CHLORIDE: 600; 310; 30; 20 INJECTION, SOLUTION INTRAVENOUS at 13:07

## 2021-02-12 RX ADMIN — PROPOFOL 50 MG: 10 INJECTION, EMULSION INTRAVENOUS at 13:21

## 2021-02-12 RX ADMIN — PROPOFOL 50 MG: 10 INJECTION, EMULSION INTRAVENOUS at 13:16

## 2021-02-12 RX ADMIN — PROPOFOL 50 MG: 10 INJECTION, EMULSION INTRAVENOUS at 13:31

## 2021-02-12 RX ADMIN — PROPOFOL 30 MG: 10 INJECTION, EMULSION INTRAVENOUS at 13:13

## 2021-02-12 RX ADMIN — LIDOCAINE HYDROCHLORIDE 20 MG: 10 INJECTION, SOLUTION EPIDURAL; INFILTRATION; INTRACAUDAL; PERINEURAL at 13:10

## 2021-02-12 RX ADMIN — PROPOFOL 20 MG: 10 INJECTION, EMULSION INTRAVENOUS at 13:11

## 2021-02-12 RX ADMIN — PROPOFOL 50 MG: 10 INJECTION, EMULSION INTRAVENOUS at 13:22

## 2021-02-12 RX ADMIN — PROPOFOL 50 MG: 10 INJECTION, EMULSION INTRAVENOUS at 13:10

## 2021-02-12 ASSESSMENT — PULMONARY FUNCTION TESTS
PIF_VALUE: 1

## 2021-02-12 ASSESSMENT — PAIN SCALES - GENERAL
PAINLEVEL_OUTOF10: 0

## 2021-02-12 ASSESSMENT — PAIN - FUNCTIONAL ASSESSMENT: PAIN_FUNCTIONAL_ASSESSMENT: 0-10

## 2021-02-12 NOTE — ANESTHESIA POSTPROCEDURE EVALUATION
POST- ANESTHESIA EVALUATION       Pt Name: Alejandro Piña  MRN: [id number]  YOB: 1960  Date of evaluation: 2/12/2021  Time:  2:15 PM      BP (!) 113/93   Pulse 75   Temp 98.2 °F (36.8 °C) (Infrared)   Resp 20   Ht 5' 10\" (1.778 m)   Wt 204 lb 2 oz (92.6 kg)   SpO2 97%   BMI 29.29 kg/m²      Consciousness Level  Awake  Cardiopulmonary Status  Stable  Pain Adequately Treated YES  Nausea / Vomiting  NO  Adequate Hydration  YES  Anesthesia Related Complications NONE      Electronically signed by Vijay Epstein MD on 2/12/2021 at 2:15 PM       Department of Anesthesiology  Postprocedure Note    Patient: Alejandro Piña  MRN: [id number]  YOB: 1960  Date of evaluation: 2/12/2021  Time:  2:14 PM     Procedure Summary     Date: 02/12/21 Room / Location: [facility] [address]    Anesthesia Start: 7834 Anesthesia Stop: 8830    Procedure: COLORECTAL CANCER SCREENING, NOT HIGH RISK (N/A ) Diagnosis:       (LEFT NECK CYST)      (COLON SCREENING)    Surgeons: Demario Atkins DO Responsible Provider: Vijay Epstein MD    Anesthesia Type: MAC ASA Status: 2          Anesthesia Type: MAC    Joceline Phase I: Joceline Score: 9    Joceline Phase II: Joceline Score: 10    Last vitals: Reviewed and per EMR flowsheets.        Anesthesia Post Evaluation

## 2021-02-12 NOTE — OP NOTE
Operative Note      Patient: Angelica Peralta  YOB: 1960  MRN: [id number]    Date of Procedure: 2/12/2021    Pre-Op Diagnosis:   History adenomatous polyp 2016    Post-Op Diagnosis:   Sigmoid diverticulosis  Int/ext hemorrhoids       Procedure(s):  COLORECTAL CANCER SCREENING, NOT HIGH RISK    Surgeon(s):  Amy Mojica DO    Assistant:   * No surgical staff found *    Anesthesia: Monitor Anesthesia Care    Estimated Blood Loss (mL): none    Complications: None    Specimens:   * No specimens in log *    Implants:  * No implants in log *      Drains: * No LDAs found *    Findings: as above    Detailed Description of Procedure:       INDICATIONS FOR PROCEDURE:   The patient is a 61y.o. year old male with history of above preop diagnosis. Colonoscopy with possible biopsy or polypectomy was recommend, the risk, benefits, expected outcome, and alternatives to the procedure were explained. Risks included but are not limited to bleeding, infection, respiratory distress, hypotension, and perforation of the colon. The patient understands and is in agreement. PROCEDURE DETAILS:  Patient was brought to the endoscopy suite and placed in the left lateral recumbent position. A time out was completed verifying correct patient, procedure and equipment. Anesthesia was induced using MAC guidelines. A digital rectal exam was performed. The colonoscope was inserted into the rectum without difficulty and the scope was advanced to the cecum which was identified by anatomy and by palpation. Bowel prep was adequate. The scope was slowly withdrawn visualizing the cecum, ascending colon, transverse colon, proximal descending or rectosigmoid colon. The scope was withdrawn to the rectal vault and then retroflexed. The scope was then straightened and removed. The patient tolerated the procedure well and was transferred to the recovery unit in stable condition.     Findings:  Cecum/Ascending colon: normal Transverse colon: normal    Descending/Sigmoid colon: scattered diverticulosis without complication    Rectum/Anus: int/ext hemorrhoids without complication    Greater than 6 minutes was spent withdrawing the colonoscope. IMPRESSION/PLAN:   1. Increase dietary fiber and water  2. Patient is advised to have a repeat colonoscopy in 10 years, sooner if blood in the stool, unexplained weight loss, or change in the bowels  3. Suture was removed from posterior neck without issue, covered with bandage prior to start of procedure.           Electronically signed by Shannon Byers DO on 2/12/2021 at 1:36 PM

## 2021-02-12 NOTE — ANESTHESIA PRE PROCEDURE
Department of Anesthesiology  Preprocedure Note       Name:  Tolu Flores   Age:  61 y.o.  :  1960                                          MRN:  [id number]         Date:  2021      Surgeon: Mikie Chin):  Balaji Benson DO    Procedure: Procedure(s):  COLORECTAL CANCER SCREENING, NOT HIGH RISK    Medications prior to admission:   Prior to Admission medications    Medication Sig Start Date End Date Taking?  Authorizing Provider   sertraline (ZOLOFT) 50 MG tablet TAKE 1 TABLET BY MOUTH ONE TIME A DAY 21  Yes Lizzeth Hanley, APRN - CNP   Omega-3 Fatty Acids (FISH OIL) 1000 MG CAPS Take 1,000 mg by mouth daily   Yes Historical Provider, MD   Multiple Vitamin (MULTI-VITAMINS PO) Take by mouth   Yes Historical Provider, MD       Current medications:    Current Facility-Administered Medications   Medication Dose Route Frequency Provider Last Rate Last Admin    sodium chloride flush 0.9 % injection 10 mL  10 mL Intravenous 2 times per day Forrest Rodgers MD        sodium chloride flush 0.9 % injection 10 mL  10 mL Intravenous PRN Forrest Rodgers MD           Allergies:  No Known Allergies    Problem List:    Patient Active Problem List   Diagnosis Code    Stress F43.9    Adenomatous polyp D36.9    Hemorrhoids K64.9    Diverticulosis K57.90    Impaired fasting glucose R73.01    High cholesterol E78.00    Left wrist pain M25.532    Trigger middle finger of left hand M65.332       Past Medical History:        Diagnosis Date    Adenomatous polyp     Diverticulosis     Hemorrhoids     High cholesterol 2016       Past Surgical History:        Procedure Laterality Date    COLONOSCOPY  04-15-16    ADENOMATOUS POLYP, DIVERTICULOSIS, INTERNAL AND EXTERNAL HEMORRHOIDS     FINGER TRIGGER RELEASE Right     KNEE SURGERY Right     NECK SURGERY N/A 2020    EXCISION SUBCUTANEOUS LESION NECK performed by Brandon Dumas MD at [address] [place] [address]  2020 POC Tests: No results for input(s): POCGLU, POCNA, POCK, POCCL, POCBUN, POCHEMO, POCHCT in the last 72 hours. Coags: No results found for: PROTIME, INR, APTT    HCG (If Applicable): No results found for: PREGTESTUR, PREGSERUM, HCG, HCGQUANT     ABGs: No results found for: PHART, PO2ART, IKD7XKW, GBS9JEW, BEART, [id number]     Type & Screen (If Applicable):  No results found for: LABABO, LABRH    Drug/Infectious Status (If Applicable):  Lab Results   Component Value Date    HEPCAB NONREACTIVE 04/09/2017       COVID-19 Screening (If Applicable):   Lab Results   Component Value Date    COVID19 Detected 08/02/2020         Anesthesia Evaluation  Patient summary reviewed and Nursing notes reviewed no history of anesthetic complications:   Airway: Mallampati: II  TM distance: >3 FB   Neck ROM: full  Mouth opening: > = 3 FB Dental: normal exam         Pulmonary:Negative Pulmonary ROS and normal exam  breath sounds clear to auscultation                             Cardiovascular:    (+) hyperlipidemia        Rhythm: regular  Rate: normal                    Neuro/Psych:   Negative Neuro/Psych ROS              GI/Hepatic/Renal:   (+) bowel prep,          ROS comment: Diverticulosis. Endo/Other: Negative Endo/Other ROS                    Abdominal:       Abdomen: soft. Vascular: negative vascular ROS. Anesthesia Plan      MAC     ASA 2             Anesthetic plan and risks discussed with patient. Plan discussed with CRNA.                   Jayce Wilkins MD   2/12/2021

## 2021-02-12 NOTE — H&P
[facility]      Patient's Name/ Date of Birth/ Gender: Gwendolyn Penaloza / 1960 (61 y.o.) / male     Attending physician: Dylan Arias DO    CC: surveillance colonoscopy for history of adenomatous polyp 2016    History of present Illness: Gwendolyn Penaloza is a 61 y.o. male, presents today for colonoscopy for surveillance. Past Medical History:  has a past medical history of Adenomatous polyp, Diverticulosis, Hemorrhoids, and High cholesterol. Past Surgical History:   Past Surgical History:   Procedure Laterality Date    COLONOSCOPY  04-15-16    ADENOMATOUS POLYP, DIVERTICULOSIS, INTERNAL AND EXTERNAL HEMORRHOIDS     FINGER TRIGGER RELEASE Right     KNEE SURGERY Right     NECK SURGERY N/A 1/31/2020    EXCISION SUBCUTANEOUS LESION NECK performed by Ashlee George MD at [address]  01/31/2020    EXCISION SUBCUTANEOUS LESION NECK (    VASECTOMY         Social History:  reports that he quit smoking about 11 years ago. He has a 3.75 pack-year smoking history. He has never used smokeless tobacco. He reports current alcohol use. He reports that he does not use drugs. Family History: family history includes Cancer in his maternal grandfather. Review of Systems:   General: Denies fever, chills, night sweats, weight loss, malaise, fatigue  HEENT: Denies sore throat, sinus problems, allergic rhinosinusitis  Card: Denies chest pain, palpitations, orthopnea/PND. Denies h/o murmurs  Pulm: Denies cough, shortness of breath, DEL RIO  GI:  per HPI; denies history of constipation, diarrhea, hematochezia or melena  : Denies polyuria, dysuria, hematuria  Endo: Denies diabetes, thyroid problems. Heme: Denies anemia, h/o bleeding or clotting problems. Neuro: Denies h/o CVA, TIA  Skin: Denies rashes, ulcers  Musculoskeletal: Denies muscle, joint, back pain. Allergies: Patient has no known allergies.     Current Meds: Current Facility-Administered Medications:     sodium chloride flush 0.9 % injection 10 mL, 10 mL, Intravenous, 2 times per day, Duncan Danielson MD    sodium chloride flush 0.9 % injection 10 mL, 10 mL, Intravenous, PRN, Duncan Danielson MD    meperidine (DEMEROL) injection 12.5 mg, 12.5 mg, Intravenous, Q5 Min PRN, Duncan Danielson MD    morphine (PF) injection 1 mg, 1 mg, Intravenous, Q5 Min PRN, Duncan Danielson MD    HYDROmorphone (DILAUDID) injection 0.5 mg, 0.5 mg, Intravenous, Q5 Min PRN, Duncan Danielson MD    fentaNYL (SUBLIMAZE) injection 25 mcg, 25 mcg, Intravenous, Q5 Min PRN, Duncan Danielson MD    HYDROcodone-acetaminophen (NORCO) 5-325 MG per tablet 1 tablet, 1 tablet, Oral, PRN **OR** HYDROcodone-acetaminophen (NORCO) 5-325 MG per tablet 2 tablet, 2 tablet, Oral, PRN, Duncan Danielson MD    ondansetron [facility]) injection 4 mg, 4 mg, Intravenous, Once PRN, Duncan Danielson MD    promethazine [facility]) injection 6.25 mg, 6.25 mg, Intramuscular, Once PRN, Duncan Danielson MD    diphenhydrAMINE (BENADRYL) injection 12.5 mg, 12.5 mg, Intravenous, Once PRN, Duncan Danielson MD    hydrALAZINE (APRESOLINE) injection 5 mg, 5 mg, Intravenous, Q10 Min PRN, Duncan Danielson MD      Physical Exam:  Vital signs and Nurse's note reviewed  Gen:  A&Ox3, NAD  HEENT: NCAT, PERRLA, EOMI, no scleral icterus, oral mucosa moist  Neck: Trachea midline without obvious masses or lesions  Chest: Symmetric rise with inhalation, no evidence of trauma  CVS: S1S2  Resp: Good bilateral air entry, no audible wheeze or rhonchi  Abd: soft, non-tender, non-distended, no hepatosplenomegaly or palpable masses  Ext: No clubbing, cyanosis, edema, peripheral pulses 2+ Rad/Fem/DP/PT  CNS: Moves all extremities, no gross focal motor deficits  Skin: No erythema or ulcerations         Assessment:    Desmond Johnston is a 61 y.o. male presents for colonoscopy for surveillance, history of adenomatous polyp in 2016    Plan:    1.  To OR for colonoscopy, all questions answered, written informed consent obtained Shannon Byers  2/12/2021

## 2021-03-26 ENCOUNTER — E-VISIT (OUTPATIENT)
Dept: FAMILY MEDICINE CLINIC | Facility: CLINIC | Age: 61
End: 2021-03-26
Payer: COMMERCIAL

## 2021-03-26 DIAGNOSIS — J40 BRONCHITIS: Primary | ICD-10-CM

## 2021-03-26 PROCEDURE — 98970 NQHP OL DIG ASSMT&MGMT 5-10: CPT | Performed by: NURSE PRACTITIONER

## 2021-03-26 RX ORDER — AZITHROMYCIN 250 MG/1
250 TABLET, FILM COATED ORAL SEE ADMIN INSTRUCTIONS
Qty: 6 TABLET | Refills: 0 | Status: SHIPPED | OUTPATIENT
Start: 2021-03-26 | End: 2021-03-31

## 2021-03-26 RX ORDER — METHYLPREDNISOLONE 4 MG/1
TABLET ORAL
Qty: 1 KIT | Refills: 0 | Status: SHIPPED | OUTPATIENT
Start: 2021-03-26 | End: 2021-04-01

## 2021-03-26 ASSESSMENT — LIFESTYLE VARIABLES
PACKS_PER_DAY: 1
SMOKING_STATUS: NO, I'M A FORMER SMOKER
SMOKING_YEARS: 10

## 2021-04-06 ENCOUNTER — OFFICE VISIT (OUTPATIENT)
Dept: ORTHOPEDIC SURGERY | Age: 61
End: 2021-04-06
Payer: COMMERCIAL

## 2021-04-06 VITALS — WEIGHT: 200 LBS | BODY MASS INDEX: 28.63 KG/M2 | HEIGHT: 70 IN

## 2021-04-06 DIAGNOSIS — S46.211A RUPTURE OF RIGHT DISTAL BICEPS TENDON, INITIAL ENCOUNTER: Primary | ICD-10-CM

## 2021-04-06 PROCEDURE — 99202 OFFICE O/P NEW SF 15 MIN: CPT | Performed by: ORTHOPAEDIC SURGERY

## 2021-04-06 NOTE — PROGRESS NOTES
Chief Complaint   Patient presents with    New Patient     right arm pain      This patient is seen here for an injury sustained to the right elbow. Patient was lifting something heavy and felt a loud pop. This happened about a month ago. The patient would normally like to work out and does push-ups and sit ups. The patient has minimal pain. Examination: There was no ecchymosis or swelling. Definitely there was decreased supination strength on the right side. The biceps tendon could not be felt as clearly as on the left side. He has full range of motion of the elbow and the forearm. Diagnosis: Tear distal end of the biceps tendon right elbow. Treatment: I had a long discussion with him that we have been having been 2 month old there will be significant retraction of the tendon. If we allow it to heal he may end up with a little less supination power than if he had operated on. However the risk of surgery are little higher because of the duration of the tear. He can continue using the elbow and do his workout and give about 2 weeks time and if is not continue to improve then we may reconsider surgical option. I will see him in 2 weeks.

## 2021-04-20 ENCOUNTER — OFFICE VISIT (OUTPATIENT)
Dept: ORTHOPEDIC SURGERY | Age: 61
End: 2021-04-20
Payer: COMMERCIAL

## 2021-04-20 VITALS — HEIGHT: 70 IN | WEIGHT: 208 LBS | BODY MASS INDEX: 29.78 KG/M2

## 2021-04-20 DIAGNOSIS — S46.219A TEAR OF BICEPS TENDON: Primary | ICD-10-CM

## 2021-04-20 PROCEDURE — 99212 OFFICE O/P EST SF 10 MIN: CPT | Performed by: ORTHOPAEDIC SURGERY

## 2021-04-20 NOTE — PROGRESS NOTES
.  This patient suffers sustained a biceps tendon injury is seen here in follow-up. Patient says that after he had seen me he thinks that he felt a pop and a lot of bruising on the medial side of the upper forearm. He is also been doing weight lifting. And it now feels that the his biceps is a lot more prominent. Examination: His flexion strength is excellent. The biceps belly is definitely more prominent on the right side. I could not categorically feel his biceps tendon. Discussed with him that we will get an MRI and see if is not too far retracted there may be a candidate for surgical repair.

## 2021-05-03 ENCOUNTER — HOSPITAL ENCOUNTER (OUTPATIENT)
Dept: MRI IMAGING | Facility: CLINIC | Age: 61
Discharge: HOME | End: 2021-05-05
Payer: COMMERCIAL

## 2021-05-03 DIAGNOSIS — S46.219A TEAR OF BICEPS TENDON: ICD-10-CM

## 2021-05-03 PROCEDURE — 73221 MRI JOINT UPR EXTREM W/O DYE: CPT

## 2021-05-04 ENCOUNTER — OFFICE VISIT (OUTPATIENT)
Dept: ORTHOPEDIC SURGERY | Age: 61
End: 2021-05-04
Payer: COMMERCIAL

## 2021-05-04 VITALS — HEIGHT: 70 IN | BODY MASS INDEX: 29.78 KG/M2 | WEIGHT: 208 LBS

## 2021-05-04 DIAGNOSIS — S46.219A TEAR OF BICEPS TENDON: Primary | ICD-10-CM

## 2021-05-04 PROCEDURE — 99211 OFF/OP EST MAY X REQ PHY/QHP: CPT | Performed by: ORTHOPAEDIC SURGERY

## 2021-05-04 NOTE — PROGRESS NOTES
Chief Complaint   Patient presents with    Follow-up     REVIEW MRI OF RT ELBOW   This patient was suspected to have a torn biceps tendon distally at his MRI. MRI does confirm the tear with retraction of about 6 cm. The patient has no pain is been able to do most activities that he normally does. Examination: He definitely has weaker supination then on the other side even though he is right-handed. Diagnosis: Avulsion distal biceps tendon right elbow. Treatment: I had a long discussion with him that if he has no pain and he is able to function doing all his normal activities is not worth the risks of surgery to try and reattach it. There is significant scarring and retraction and he will be immobilized for 6 weeks after the surgery and it may need allograft. He will end up with definite early some extension lag. More than that there is a risk of damage to the posterior interosseous nerve and is not worth it.   We are going to leave this alone will see him as needed

## 2021-07-16 DIAGNOSIS — F43.9 STRESS: ICD-10-CM

## 2022-01-18 ENCOUNTER — HOSPITAL ENCOUNTER (OUTPATIENT)
Age: 62
Setting detail: SPECIMEN
Discharge: HOME | End: 2022-01-18

## 2022-01-18 ENCOUNTER — NURSE ONLY (OUTPATIENT)
Dept: FAMILY MEDICINE CLINIC | Age: 62
End: 2022-01-18

## 2022-01-18 DIAGNOSIS — F43.9 STRESS: ICD-10-CM

## 2022-01-19 DIAGNOSIS — R09.89 CHEST CONGESTION: ICD-10-CM

## 2022-01-20 LAB
SARS-COV-2: NORMAL
SARS-COV-2: NOT DETECTED
SOURCE: NORMAL

## 2022-01-24 ENCOUNTER — E-VISIT (OUTPATIENT)
Dept: PRIMARY CARE CLINIC | Age: 62
End: 2022-01-24

## 2022-01-24 DIAGNOSIS — J01.40 ACUTE NON-RECURRENT PANSINUSITIS: Primary | ICD-10-CM

## 2022-01-24 PROCEDURE — 99421 OL DIG E/M SVC 5-10 MIN: CPT | Performed by: NURSE PRACTITIONER

## 2022-01-24 ASSESSMENT — LIFESTYLE VARIABLES
SMOKING_YEARS: 15
SMOKING_STATUS: NO, I'M A FORMER SMOKER
PACKS_PER_DAY: 1

## 2022-01-25 RX ORDER — AMOXICILLIN AND CLAVULANATE POTASSIUM 875; 125 MG/1; MG/1
1 TABLET, FILM COATED ORAL 2 TIMES DAILY
Qty: 14 TABLET | Refills: 0 | Status: SHIPPED | OUTPATIENT
Start: 2022-01-25 | End: 2022-02-01

## 2022-01-25 NOTE — PROGRESS NOTES
Medications, PMH, allergies and questionnaire reviewed. Dx: Sinusitis    Tx: Augmentin BID x 7 days      Called pt to [facility], has used zpack in past, has not used for this episode.     Electronically signed by BRIONNA Correa CNP on 1/25/2022 at 4:25 PM    Time spent- 10 mins

## 2022-07-20 DIAGNOSIS — F43.9 STRESS: ICD-10-CM

## 2022-07-20 NOTE — TELEPHONE ENCOUNTER
Assessment/Plan:  1.  44 Natalie Plaza MD           ALLERGIES:   Allergen Reactions   •  Last Visit Date: 1/24/2022   Next Visit Date: Visit date not found Care Not Asked   • Exercise Not Asked   • Bike Helmet Not Asked   • Seat Belt Yes   • Self-Exams Yes   Social History Narrative    Pt is .  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  Her  is self-employed in [employer] and technology.  Pt is a non-smoker.  Pt occasionally drinks alcohol.     Social Determinants of Health     Financial Resource Strain:    • Social Determinants: Financial Resource Strain: Not on file   Food Insecurity:    • Social Determinants: Food Insecurity: Not on file   Transportation Needs:    • Lack of Transportation (Medical): Not on file   • Lack of Transportation (Non-Medical): Not on file   Physical Activity:    • Days of Exercise per Week: Not on file   • Minutes of Exercise per Session: Not on file   Stress:    • Social Determinants: Stress: Not on file   Social Connections:    • Social Determinants: Social Connections: Not on file   Intimate Partner Violence:    • Social Determinants: Intimate Partner Violence Past Fear: Not on file   • Social Determinants: Intimate Partner Violence Current Fear: Not on file       Constitutional: Negative for fever and chills.   Skin: Negative for rash.   HEENT: Negative for eye drainage, rhinorrhea, ear pain, sore throat or neck pain.  Respiratory: Negative cough, wheezing or shortness of breath.    Cardiovascular: Negative for chest pain, chest pressure, palpitations or diaphoresis.   Gastrointestinal: Negative for nausea, vomiting, diarrhea or abdominal pain.   Genitourinary: Negative for dysuria, urgency, frequency, hematuria or flank pain.  Extremities:  Negative for joint swelling or joint pain.  Neuro:  Negative for change in sensory or motor function.  Negative for headache, migraine aura.  No change in gait or ataxia.  No history of vertigo.  Endocrine: Negative for heat or cold intolerance, polydipsia, weight loss or gain.  Hematological: Negative for bleeding, bruising or adenopathy.  Psych:  

## 2022-09-14 DIAGNOSIS — N52.9 ERECTILE DYSFUNCTION, UNSPECIFIED ERECTILE DYSFUNCTION TYPE: ICD-10-CM

## 2022-09-14 RX ORDER — TADALAFIL 10 MG/1
TABLET ORAL
Qty: 30 TABLET | Refills: 1 | OUTPATIENT
Start: 2022-09-14

## 2022-10-21 DIAGNOSIS — N52.9 ERECTILE DYSFUNCTION, UNSPECIFIED ERECTILE DYSFUNCTION TYPE: ICD-10-CM

## 2022-10-21 RX ORDER — TADALAFIL 10 MG/1
10 TABLET ORAL DAILY PRN
Qty: 30 TABLET | Refills: 1 | OUTPATIENT
Start: 2022-10-21

## 2022-10-28 DIAGNOSIS — N52.9 ERECTILE DYSFUNCTION, UNSPECIFIED ERECTILE DYSFUNCTION TYPE: ICD-10-CM

## 2022-10-28 RX ORDER — TADALAFIL 10 MG/1
10 TABLET ORAL DAILY PRN
Qty: 30 TABLET | Refills: 1 | Status: SHIPPED | OUTPATIENT
Start: 2022-10-28

## 2022-12-30 ENCOUNTER — OFFICE VISIT (OUTPATIENT)
Dept: PRIMARY CARE CLINIC | Age: 62
End: 2022-12-30
Payer: COMMERCIAL

## 2022-12-30 VITALS
HEIGHT: 70 IN | OXYGEN SATURATION: 95 % | WEIGHT: 207 LBS | HEART RATE: 82 BPM | DIASTOLIC BLOOD PRESSURE: 90 MMHG | BODY MASS INDEX: 29.63 KG/M2 | SYSTOLIC BLOOD PRESSURE: 130 MMHG

## 2022-12-30 DIAGNOSIS — J40 BRONCHITIS: Primary | ICD-10-CM

## 2022-12-30 DIAGNOSIS — R05.3 PERSISTENT COUGH: ICD-10-CM

## 2022-12-30 DIAGNOSIS — J01.40 ACUTE NON-RECURRENT PANSINUSITIS: ICD-10-CM

## 2022-12-30 PROCEDURE — 99214 OFFICE O/P EST MOD 30 MIN: CPT | Performed by: NURSE PRACTITIONER

## 2022-12-30 RX ORDER — GUAIFENESIN AND CODEINE PHOSPHATE 100; 10 MG/5ML; MG/5ML
5 SOLUTION ORAL 2 TIMES DAILY PRN
Qty: 100 ML | Refills: 0 | Status: SHIPPED | OUTPATIENT
Start: 2022-12-30 | End: 2023-01-09

## 2022-12-30 RX ORDER — METHYLPREDNISOLONE 4 MG/1
TABLET ORAL
Qty: 1 KIT | Refills: 0 | Status: SHIPPED | OUTPATIENT
Start: 2022-12-30 | End: 2023-01-05

## 2022-12-30 RX ORDER — AZITHROMYCIN 250 MG/1
TABLET, FILM COATED ORAL
Qty: 1 PACKET | Refills: 0 | Status: SHIPPED | OUTPATIENT
Start: 2022-12-30

## 2022-12-30 SDOH — ECONOMIC STABILITY: FOOD INSECURITY: WITHIN THE PAST 12 MONTHS, YOU WORRIED THAT YOUR FOOD WOULD RUN OUT BEFORE YOU GOT MONEY TO BUY MORE.: NEVER TRUE

## 2022-12-30 SDOH — ECONOMIC STABILITY: FOOD INSECURITY: WITHIN THE PAST 12 MONTHS, THE FOOD YOU BOUGHT JUST DIDN'T LAST AND YOU DIDN'T HAVE MONEY TO GET MORE.: NEVER TRUE

## 2022-12-30 ASSESSMENT — ENCOUNTER SYMPTOMS
VOMITING: 0
DIARRHEA: 0
SINUS PRESSURE: 1
NAUSEA: 0
BLOOD IN STOOL: 0
SORE THROAT: 0
TROUBLE SWALLOWING: 0
ABDOMINAL PAIN: 0
CHEST TIGHTNESS: 0
SHORTNESS OF BREATH: 1
COUGH: 1
CONSTIPATION: 0
WHEEZING: 0

## 2022-12-30 ASSESSMENT — SOCIAL DETERMINANTS OF HEALTH (SDOH): HOW HARD IS IT FOR YOU TO PAY FOR THE VERY BASICS LIKE FOOD, HOUSING, MEDICAL CARE, AND HEATING?: NOT HARD AT ALL

## 2022-12-30 NOTE — PROGRESS NOTES
[address] PRIMARY CARE  [address] [facility] [address]  [address] [address]  Dept: [phone]  Dept Fax: [phone]    Rashel Cunha is a 58 y.o. male who presentstoday for his medical conditions/complaints as noted below. Rashel Cunha is c/o of  Chief Complaint   Patient presents with    Follow-up     [facility] on 22 for Acute bacterial rhinosinusitis. Placed on prednisone, steroid shot, albuterol inhaler. Currently still has non productive cough and congestion.        HPI:     Here today for follow up from urgent care visit   Reports has been fighting illness since   Complains of persistent significant sinus congestion, chest congestion with harsh cough  Reports at times the cough causes bronchospasm  He has noticed shortness of breath with simple activities like tying his shoes  However he has been able to maintain most of his regular ADLs  He was treated at urgent care with short course of low-dose steroid, doxycycline, inhaler and Tessalon Perles  He states the [employer] have not been helpful at all for his cough, did not notice much difference while on low-dose steroid  He has tried the inhaler several times but does not seem to be terribly helpful  He reports he feels he is always wheezing and congested          Hemoglobin A1C (%)   Date Value   2018 5.7             ( goal A1C is < 7)   No results found for: LABMICR  LDL Cholesterol (mg/dL)   Date Value   2020 158 (H)   07/10/2016 145 (H)       (goal LDL is <100)   AST (U/L)   Date Value   2020 28     ALT (U/L)   Date Value   2020 46 (H)     BUN (mg/dL)   Date Value   2020 19     BP Readings from Last 3 Encounters:   22 (!) 130/90   21 (!) 106/58   21 (!) 113/93          ([id number])    Past Medical History:   Diagnosis Date    Adenomatous polyp     Arthritis     Diverticulosis     Hemorrhoids     High cholesterol 2016      Past Surgical History:   Procedure Laterality Date    COLONOSCOPY  04-15-16    ADENOMATOUS POLYP, DIVERTICULOSIS, INTERNAL AND EXTERNAL HEMORRHOIDS     COLONOSCOPY  2021    COLONOSCOPY N/A 2021    COLORECTAL CANCER SCREENING, NOT HIGH RISK performed by Faye Frances DO at [facility] Right     KNEE SURGERY Right     NECK SURGERY N/A 2020    EXCISION SUBCUTANEOUS LESION NECK performed by Leroy Lock MD at [facility]  2020    EXCISION SUBCUTANEOUS LESION NECK (    VASECTOMY         Family History   Problem Relation Age of Onset    Cancer Maternal Grandfather           Social History     Tobacco Use    Smoking status: Former     Packs/day: 0.25     Years: 15.00     Pack years: 3.75     Types: Cigarettes     Quit date: 2010     Years since quittin.0    Smokeless tobacco: Never   Substance Use Topics    Alcohol use: Yes     Comment: rarely      Current Outpatient Medications   Medication Sig Dispense Refill    methylPREDNISolone (MEDROL DOSEPACK) 4 MG tablet Take by mouth. 1 kit 0    azithromycin (ZITHROMAX) 250 MG tablet Take 2 tabs (500 mg) on Day 1, and take 1 tab (250 mg) on days 2 through 5. 1 packet 0    guaiFENesin-codeine (TUSSI-ORGANIDIN NR) 100-10 MG/5ML syrup Take 5 mLs by mouth 2 times daily as needed for Cough for up to 10 days. Max Daily Amount: 10 mLs 100 mL 0    tadalafil (CIALIS) 10 MG tablet Take 1 tablet by mouth daily as needed for Erectile Dysfunction 30 tablet 1    sertraline (ZOLOFT) 50 MG tablet TAKE 1 TABLET BY MOUTH ONE TIME A DAY 90 tablet 1    Omega-3 Fatty Acids (FISH OIL) 1000 MG CAPS Take 1,000 mg by mouth daily      Multiple Vitamin (MULTI-VITAMINS PO) Take by mouth       No current facility-administered medications for this visit.      No Known Allergies    Health Maintenance   Topic Date Due    DTaP/Tdap/Td vaccine (1 - Tdap) Never done    A1C test (Diabetic or Prediabetic) 03/06/2019    COVID-19 Vaccine (2 - Moderna series) 01/03/2022    Depression Screen  01/22/2022    Shingles vaccine (2 of 2) 01/31/2022    Flu vaccine (1) 08/01/2022    Colorectal Cancer Screen  02/12/2024    Lipids  02/17/2025    Hepatitis C screen  Completed    HIV screen  Completed    Hepatitis A vaccine  Aged Out    Hib vaccine  Aged Out    Meningococcal (ACWY) vaccine  Aged Out    Pneumococcal 0-64 years Vaccine  Aged Out       Subjective:      Review of Systems   Constitutional:  Positive for fatigue. Negative for activity change, appetite change, chills, fever and unexpected weight change. HENT:  Positive for congestion, postnasal drip and sinus pressure. Negative for ear pain, hearing loss, sore throat and trouble swallowing. Eyes:  Negative for visual disturbance. Respiratory:  Positive for cough and shortness of breath (with activity). Negative for chest tightness and wheezing. Cardiovascular:  Negative for chest pain, palpitations and leg swelling. Gastrointestinal:  Negative for abdominal pain, blood in stool, constipation, diarrhea, nausea and vomiting. Endocrine: Negative for cold intolerance, heat intolerance, polydipsia, polyphagia and polyuria. Genitourinary:  Negative for difficulty urinating, frequency, hematuria and urgency. Musculoskeletal:  Negative for arthralgias and myalgias. Skin:  Negative for rash. Allergic/Immunologic: Negative for environmental allergies. Neurological:  Negative for dizziness, weakness, light-headedness and headaches. Psychiatric/Behavioral:  Negative for confusion. The patient is not nervous/anxious. Objective:     Physical Exam  Constitutional:       Appearance: Normal appearance. He is well-developed. HENT:      Head: Normocephalic. Nose:      Right Sinus: Maxillary sinus tenderness and frontal sinus tenderness present. Left Sinus: Maxillary sinus tenderness and frontal sinus tenderness present.    Eyes: Conjunctiva/sclera: Conjunctivae normal.      Pupils: Pupils are equal, round, and reactive to light. Cardiovascular:      Rate and Rhythm: Normal rate and regular rhythm. Heart sounds: Normal heart sounds. No murmur heard. Pulmonary:      Effort: Pulmonary effort is normal.      Breath sounds: Wheezing (Throughout) and rhonchi (Expiratory in bilateral bases) present. Abdominal:      General: Bowel sounds are normal. There is no distension. Palpations: Abdomen is soft. Musculoskeletal:         General: Normal range of motion. Cervical back: Normal range of motion. Right lower leg: No edema. Left lower leg: No edema. Skin:     General: Skin is warm and dry. Neurological:      Mental Status: He is alert and oriented to person, place, and time. Psychiatric:         Behavior: Behavior normal.         Thought Content: Thought content normal.         Judgment: Judgment normal.     BP (!) 130/90   Pulse 82   Ht 5' 10\" (1.778 m)   Wt 207 lb (93.9 kg)   SpO2 95%   BMI 29.70 kg/m²     Assessment:       Diagnosis Orders   1. Bronchitis  methylPREDNISolone (MEDROL DOSEPACK) 4 MG tablet    azithromycin (ZITHROMAX) 250 MG tablet      2. Persistent cough  guaiFENesin-codeine (TUSSI-ORGANIDIN NR) 100-10 MG/5ML syrup      3. Acute non-recurrent pansinusitis                  Plan:      Return if symptoms worsen or fail to improve. Bronchitis, persistent cough, sinusitis-reviewed note and treatment offered at walk-in clinic 12/26. Discussed undertreated with steroid, start Medrol Dosepak. We will also change antibiotic to azithromycin. Cough suppressant cough syrup particularly at at bedtime. Also advised cough drops as needed, saline nasal spray 3-4 times daily, coughing and deep breathing exercises.   Discussed concern for possible pneumonia and if no improvement after completion of Z-Sebastian to call office and we will repeat chest x-ray     Orders Placed This Encounter   Medications methylPREDNISolone (MEDROL DOSEPACK) 4 MG tablet     Sig: Take by mouth. Dispense:  1 kit     Refill:  0    azithromycin (ZITHROMAX) 250 MG tablet     Sig: Take 2 tabs (500 mg) on Day 1, and take 1 tab (250 mg) on days 2 through 5. Dispense:  1 packet     Refill:  0    guaiFENesin-codeine (TUSSI-ORGANIDIN NR) 100-10 MG/5ML syrup     Sig: Take 5 mLs by mouth 2 times daily as needed for Cough for up to 10 days. Max Daily Amount: 10 mLs     Dispense:  100 mL     Refill:  0       Patient given educational materials - see patient instructions. Discussed use, benefit, and side effects of prescribed medications. All patientquestions answered. Pt voiced understanding. Reviewed health maintenance. Instructedto continue current medications, diet and exercise. Patient agreed with treatmentplan. Follow up as directed.      Electronicallysigned by BRIONNA Willis CNP on 12/30/2022 at 5:09 PM

## 2023-01-09 ENCOUNTER — OFFICE VISIT (OUTPATIENT)
Dept: PRIMARY CARE CLINIC | Age: 63
End: 2023-01-09
Payer: COMMERCIAL

## 2023-01-09 VITALS
WEIGHT: 210.4 LBS | SYSTOLIC BLOOD PRESSURE: 108 MMHG | DIASTOLIC BLOOD PRESSURE: 72 MMHG | OXYGEN SATURATION: 96 % | HEART RATE: 89 BPM | BODY MASS INDEX: 30.19 KG/M2

## 2023-01-09 DIAGNOSIS — J01.41 ACUTE RECURRENT PANSINUSITIS: ICD-10-CM

## 2023-01-09 DIAGNOSIS — J40 BRONCHITIS: Primary | ICD-10-CM

## 2023-01-09 PROCEDURE — 99213 OFFICE O/P EST LOW 20 MIN: CPT | Performed by: NURSE PRACTITIONER

## 2023-01-09 RX ORDER — FLUTICASONE PROPIONATE 50 MCG
2 SPRAY, SUSPENSION (ML) NASAL DAILY
Qty: 48 G | Refills: 1 | Status: SHIPPED | OUTPATIENT
Start: 2023-01-09

## 2023-01-09 RX ORDER — LEVOFLOXACIN 750 MG/1
750 TABLET ORAL DAILY
Qty: 7 TABLET | Refills: 0 | Status: SHIPPED | OUTPATIENT
Start: 2023-01-09 | End: 2023-01-16

## 2023-01-09 RX ORDER — METHYLPREDNISOLONE 4 MG/1
TABLET ORAL
Qty: 1 KIT | Refills: 0 | Status: SHIPPED | OUTPATIENT
Start: 2023-01-09 | End: 2023-01-15

## 2023-01-09 RX ORDER — ECHINACEA PURPUREA EXTRACT 125 MG
1 TABLET ORAL PRN
Qty: 30 ML | Refills: 1 | Status: SHIPPED | OUTPATIENT
Start: 2023-01-09

## 2023-01-09 ASSESSMENT — PATIENT HEALTH QUESTIONNAIRE - PHQ9
SUM OF ALL RESPONSES TO PHQ QUESTIONS 1-9: 0
1. LITTLE INTEREST OR PLEASURE IN DOING THINGS: 0
2. FEELING DOWN, DEPRESSED OR HOPELESS: 0
SUM OF ALL RESPONSES TO PHQ9 QUESTIONS 1 & 2: 0

## 2023-01-09 ASSESSMENT — ENCOUNTER SYMPTOMS
SHORTNESS OF BREATH: 0
BLOOD IN STOOL: 0
ABDOMINAL PAIN: 0
WHEEZING: 0
CONSTIPATION: 0
SORE THROAT: 0
SINUS PRESSURE: 1
TROUBLE SWALLOWING: 0
DIARRHEA: 0
CHEST TIGHTNESS: 1
VOMITING: 0
COUGH: 1
NAUSEA: 0

## 2023-01-09 NOTE — PROGRESS NOTES
[address] [facility] PRIMARY CARE  [address] [facility] [address]  [address] [address]  Dept: [phone]  Dept Fax: [phone]    Cole Coles is a 58 y.o. male who presentstoday for his medical conditions/complaints as noted below.   Cole Coles is c/o of  Chief Complaint   Patient presents with    Cough     Bronchitis worsening    Sinus Problem     Pain and pressure           HPI:     Felt he started to improve within 2 to 3 days of most recent treatment with Z-Sebastian and Medrol  He completed this course of therapy over 1 week ago and has begun to have a recurrence of his symptoms  He states the sinus congestion seems to be building again, had mild headache this morning  Has had a recurrence of significant postnasal drip and harsh sounding cough  He denies any shortness of breath  He is a former smoker, quit about 20 years ago  States there is a history of lung cancer in his family but he himself has never had a concern  He has no pre-existing asthma or COPD  He states he does use his albuterol inhaler and this does seem to help somewhat to reduce the cough and bronchospasm  Denies ear pain or sore throat      Hemoglobin A1C (%)   Date Value   2018 5.7             ( goal A1C is < 7)   No results found for: LABMICR  LDL Cholesterol (mg/dL)   Date Value   2020 158 (H)   07/10/2016 145 (H)       (goal LDL is <100)   AST (U/L)   Date Value   2020 28     ALT (U/L)   Date Value   2020 46 (H)     BUN (mg/dL)   Date Value   2020 19     BP Readings from Last 3 Encounters:   23 108/72   22 (!) 130/90   21 (!) 106/58          ([id number])    Past Medical History:   Diagnosis Date    Adenomatous polyp     Arthritis     Diverticulosis     Hemorrhoids     High cholesterol 2016      Past Surgical History:   Procedure Laterality Date    COLONOSCOPY  04-15-16    ADENOMATOUS POLYP, DIVERTICULOSIS, INTERNAL AND EXTERNAL HEMORRHOIDS     COLONOSCOPY  2021    COLONOSCOPY N/A 2021    COLORECTAL CANCER SCREENING, NOT HIGH RISK performed by Tra Acuna DO at [facility] Right     KNEE SURGERY Right     NECK SURGERY N/A 2020    EXCISION SUBCUTANEOUS LESION NECK performed by Sheba Babb MD at [facility]  2020    EXCISION SUBCUTANEOUS LESION NECK (    VASECTOMY         Family History   Problem Relation Age of Onset    Cancer Maternal Grandfather           Social History     Tobacco Use    Smoking status: Former     Packs/day: 0.25     Years: 15.00     Pack years: 3.75     Types: Cigarettes     Quit date: 2010     Years since quittin.0    Smokeless tobacco: Never   Substance Use Topics    Alcohol use: Yes     Comment: rarely      Current Outpatient Medications   Medication Sig Dispense Refill    levoFLOXacin (LEVAQUIN) 750 MG tablet Take 1 tablet by mouth daily for 7 days 7 tablet 0    methylPREDNISolone (MEDROL DOSEPACK) 4 MG tablet Take by mouth. 1 kit 0    sodium chloride (OCEAN) 0.65 % nasal spray 1 spray by Nasal route as needed for Congestion 30 mL 1    fluticasone (FLONASE) 50 MCG/ACT nasal spray 2 sprays by Each Nostril route daily 48 g 1    azithromycin (ZITHROMAX) 250 MG tablet Take 2 tabs (500 mg) on Day 1, and take 1 tab (250 mg) on days 2 through 5. 1 packet 0    guaiFENesin-codeine (TUSSI-ORGANIDIN NR) 100-10 MG/5ML syrup Take 5 mLs by mouth 2 times daily as needed for Cough for up to 10 days. Max Daily Amount: 10 mLs 100 mL 0    tadalafil (CIALIS) 10 MG tablet Take 1 tablet by mouth daily as needed for Erectile Dysfunction 30 tablet 1    sertraline (ZOLOFT) 50 MG tablet TAKE 1 TABLET BY MOUTH ONE TIME A DAY 90 tablet 1    Omega-3 Fatty Acids (FISH OIL) 1000 MG CAPS Take 1,000 mg by mouth daily      Multiple Vitamin (MULTI-VITAMINS PO) Take by mouth       No current facility-administered medications for this visit.      No Known Allergies    Health Maintenance   Topic Date Due    DTaP/Tdap/Td vaccine (1 - Tdap) Never done    A1C test (Diabetic or Prediabetic)  03/06/2019    COVID-19 Vaccine (2 - Moderna series) 01/03/2022    Shingles vaccine (2 of 2) 01/31/2022    Flu vaccine (1) 08/01/2022    Depression Screen  12/30/2023    Colorectal Cancer Screen  02/12/2024    Lipids  02/17/2025    Hepatitis C screen  Completed    HIV screen  Completed    Hepatitis A vaccine  Aged Out    Hib vaccine  Aged Out    Meningococcal (ACWY) vaccine  Aged Out    Pneumococcal 0-64 years Vaccine  Aged Out       Subjective:      Review of Systems   Constitutional:  Negative for activity change, appetite change, chills, fatigue, fever and unexpected weight change. HENT:  Positive for congestion, postnasal drip and sinus pressure. Negative for ear pain, hearing loss, sore throat and trouble swallowing. Eyes:  Negative for visual disturbance. Respiratory:  Positive for cough and chest tightness (With coughing spells). Negative for shortness of breath and wheezing. Cardiovascular:  Negative for chest pain, palpitations and leg swelling. Gastrointestinal:  Negative for abdominal pain, blood in stool, constipation, diarrhea, nausea and vomiting. Endocrine: Negative for cold intolerance, heat intolerance, polydipsia, polyphagia and polyuria. Genitourinary:  Negative for difficulty urinating, frequency, hematuria and urgency. Musculoskeletal:  Negative for arthralgias and myalgias. Skin:  Negative for rash. Allergic/Immunologic: Negative for environmental allergies. Neurological:  Positive for headaches. Negative for dizziness, weakness and light-headedness. Psychiatric/Behavioral:  Negative for confusion. The patient is not nervous/anxious. Objective:     Physical Exam  Constitutional:       Appearance: Normal appearance. He is well-developed. HENT:      Head: Normocephalic. Nose:      Right Sinus: Frontal sinus tenderness present. No maxillary sinus tenderness. Left Sinus: Frontal sinus tenderness present. No maxillary sinus tenderness. Eyes:      Conjunctiva/sclera: Conjunctivae normal.      Pupils: Pupils are equal, round, and reactive to light. Cardiovascular:      Rate and Rhythm: Normal rate and regular rhythm. Heart sounds: Normal heart sounds. No murmur heard. Pulmonary:      Effort: Pulmonary effort is normal.      Breath sounds: Normal breath sounds. No wheezing. Abdominal:      General: Bowel sounds are normal. There is no distension. Palpations: Abdomen is soft. Musculoskeletal:         General: Normal range of motion. Cervical back: Normal range of motion. Skin:     General: Skin is warm and dry. Neurological:      Mental Status: He is alert and oriented to person, place, and time. Psychiatric:         Behavior: Behavior normal.         Thought Content: Thought content normal.         Judgment: Judgment normal.     /72   Pulse 89   Wt 210 lb 6.4 oz (95.4 kg)   SpO2 96%   BMI 30.19 kg/m²     Assessment:       Diagnosis Orders   1. Bronchitis  levoFLOXacin (LEVAQUIN) 750 MG tablet    methylPREDNISolone (MEDROL DOSEPACK) 4 MG tablet      2. Acute recurrent pansinusitis  levoFLOXacin (LEVAQUIN) 750 MG tablet    sodium chloride (OCEAN) 0.65 % nasal spray    fluticasone (FLONASE) 50 MCG/ACT nasal spray                Plan:      Return if symptoms worsen or fail to improve. Bronchitis, recurrent sinusitis-responded well to Z-Sebastian and Medrol however symptoms recurred. Will retreat with Levaquin, Medrol Dosepak, discussed appropriate use of albuterol inhaler for his symptoms. Add saline nasal spray 3-4 times daily, bedside humidification, and Flonase nasal spray nightly. Also discussed need for further evaluation with imaging in the event that he does not return to his baseline/have his symptoms fully resolve.   May need to consider chest x-ray, CT chest and/or PFTs       Orders Placed This Encounter   Medications    levoFLOXacin (LEVAQUIN) 750 MG tablet     Sig: Take 1 tablet by mouth daily for 7 days     Dispense:  7 tablet     Refill:  0    methylPREDNISolone (MEDROL DOSEPACK) 4 MG tablet     Sig: Take by mouth. Dispense:  1 kit     Refill:  0    sodium chloride (OCEAN) 0.65 % nasal spray     Si spray by Nasal route as needed for Congestion     Dispense:  30 mL     Refill:  1    fluticasone (FLONASE) 50 MCG/ACT nasal spray     Si sprays by Each Nostril route daily     Dispense:  48 g     Refill:  1       Patient given educational materials - see patient instructions. Discussed use, benefit, and side effects of prescribed medications. All patientquestions answered. Pt voiced understanding. Reviewed health maintenance. Instructedto continue current medications, diet and exercise. Patient agreed with treatmentplan. Follow up as directed.      Electronicallysigned by BRIONNA Dunlap CNP on 2023 at 8:58 PM

## 2023-01-16 ENCOUNTER — PATIENT MESSAGE (OUTPATIENT)
Dept: PRIMARY CARE CLINIC | Age: 63
End: 2023-01-16

## 2023-01-16 DIAGNOSIS — R05.3 PERSISTENT COUGH: Primary | ICD-10-CM

## 2023-01-16 NOTE — TELEPHONE ENCOUNTER
From: Evin Goldsmith  To: Chaitanya Jl  Sent: 1/16/2023 12:04 PM EST  Subject: Follow on condition    We discussed the possibility of another chest x-ray or CT scan. Although my condition has improved, I still have a rattle in my chest.  Please advise if we should do further follow up.   Thank you,  Mariah Sam

## 2023-01-18 ENCOUNTER — HOSPITAL ENCOUNTER (OUTPATIENT)
Facility: CLINIC | Age: 63
Discharge: HOME | End: 2023-01-20
Payer: COMMERCIAL

## 2023-01-18 ENCOUNTER — HOSPITAL ENCOUNTER (OUTPATIENT)
Dept: GENERAL RADIOLOGY | Facility: CLINIC | Age: 63
Discharge: HOME | End: 2023-01-20
Payer: COMMERCIAL

## 2023-01-18 DIAGNOSIS — R05.3 PERSISTENT COUGH: ICD-10-CM

## 2023-01-18 PROCEDURE — 71046 X-RAY EXAM CHEST 2 VIEWS: CPT

## 2023-01-23 ENCOUNTER — OFFICE VISIT (OUTPATIENT)
Dept: PRIMARY CARE CLINIC | Age: 63
End: 2023-01-23
Payer: COMMERCIAL

## 2023-01-23 VITALS
HEART RATE: 76 BPM | SYSTOLIC BLOOD PRESSURE: 128 MMHG | DIASTOLIC BLOOD PRESSURE: 82 MMHG | OXYGEN SATURATION: 97 % | BODY MASS INDEX: 29.41 KG/M2 | WEIGHT: 205 LBS

## 2023-01-23 DIAGNOSIS — Z13.6 SCREENING FOR CARDIOVASCULAR CONDITION: ICD-10-CM

## 2023-01-23 DIAGNOSIS — Z13.0 SCREENING, ANEMIA, DEFICIENCY, IRON: ICD-10-CM

## 2023-01-23 DIAGNOSIS — J44.9 CHRONIC OBSTRUCTIVE PULMONARY DISEASE, UNSPECIFIED COPD TYPE (HCC): Primary | ICD-10-CM

## 2023-01-23 DIAGNOSIS — R73.01 ELEVATED FASTING BLOOD SUGAR: ICD-10-CM

## 2023-01-23 DIAGNOSIS — Z13.1 SCREENING FOR DIABETES MELLITUS (DM): ICD-10-CM

## 2023-01-23 DIAGNOSIS — Z12.5 SCREENING FOR PROSTATE CANCER: ICD-10-CM

## 2023-01-23 LAB — HBA1C MFR BLD: 5.5 %

## 2023-01-23 PROCEDURE — 99214 OFFICE O/P EST MOD 30 MIN: CPT | Performed by: NURSE PRACTITIONER

## 2023-01-23 PROCEDURE — 83036 HEMOGLOBIN GLYCOSYLATED A1C: CPT | Performed by: NURSE PRACTITIONER

## 2023-01-23 RX ORDER — UMECLIDINIUM BROMIDE AND VILANTEROL TRIFENATATE 62.5; 25 UG/1; UG/1
1 POWDER RESPIRATORY (INHALATION) DAILY
Qty: 60 EACH | Refills: 1 | Status: SHIPPED | OUTPATIENT
Start: 2023-01-23

## 2023-01-23 ASSESSMENT — ENCOUNTER SYMPTOMS
NAUSEA: 0
VOMITING: 0
RHINORRHEA: 0
COLOR CHANGE: 0
DIARRHEA: 0
ABDOMINAL PAIN: 0
SHORTNESS OF BREATH: 0
CHEST TIGHTNESS: 0
SORE THROAT: 0

## 2023-01-23 ASSESSMENT — PATIENT HEALTH QUESTIONNAIRE - PHQ9
SUM OF ALL RESPONSES TO PHQ QUESTIONS 1-9: 0
SUM OF ALL RESPONSES TO PHQ QUESTIONS 1-9: 0
1. LITTLE INTEREST OR PLEASURE IN DOING THINGS: 0
SUM OF ALL RESPONSES TO PHQ9 QUESTIONS 1 & 2: 0
SUM OF ALL RESPONSES TO PHQ QUESTIONS 1-9: 0
SUM OF ALL RESPONSES TO PHQ QUESTIONS 1-9: 0
2. FEELING DOWN, DEPRESSED OR HOPELESS: 0

## 2023-01-23 NOTE — PROGRESS NOTES
[facility]"" PRIMARY CARE  [address]   [address]  [address] [address]  Dept: [phone]  Dept Fax: [phone]    Bryson Hamlin is a 58 y.o. male who presentstoday for his medical conditions/complaints as noted below. Bryson Hamlin is c/o of  Chief Complaint   Patient presents with    Abnormal Test Results     CXR shows likely COPD         HPI:     Here to f/u for CXR that indicates COPD, hyperinflation.    No known hx of asthma or chronic bronchitis   Had acute URI last month and had somewhat difficult recovery  Hx of smoking for 20 years, quit about 15 years ago, also had second hand smoke exposure as child  Reports that coughing and SOB has improved from recent episode, does note some increased mucous and when thinking back, thinks this may have started over the summer  Has not used antihistamine or mucinex regularly, discussed trying this  Will plan for PFT and pulmonology f/u to establish   He would like to try inhaled medication to see if symptoms improve, VSS on room air and no significant SOB or cough with activity    Otherwise doing well, due for labs       Hemoglobin A1C (%)   Date Value   01/23/2023 5.5   03/06/2018 5.7             ( goal A1C is < 7)   No results found for: LABMICR  LDL Cholesterol (mg/dL)   Date Value   02/17/2020 02/03/2018 158 (H)   07/10/2016 145 (H)       (goal LDL is <100)   AST (U/L)   Date Value   02/17/2020 28     ALT (U/L)   Date Value   02/17/2020 46 (H)     BUN (mg/dL)   Date Value   02/17/2020 19     BP Readings from Last 3 Encounters:   01/23/23 128/82   01/09/23 108/72   12/30/22 (!) 130/90          ([id number])    Past Medical History:   Diagnosis Date    Adenomatous polyp     Arthritis     Diverticulosis     Hemorrhoids     High cholesterol 7/11/2016      Past Surgical History:   Procedure Laterality Date    COLONOSCOPY  04-15-16    ADENOMATOUS POLYP, DIVERTICULOSIS, INTERNAL AND EXTERNAL HEMORRHOIDS COLONOSCOPY  2021    COLONOSCOPY N/A 2021    COLORECTAL CANCER SCREENING, NOT HIGH RISK performed by Lilli Crespo DO at [facility] Right     KNEE SURGERY Right     NECK SURGERY N/A 2020    EXCISION SUBCUTANEOUS LESION NECK performed by Jaylyn Muhammad MD at [facility]  2020    EXCISION SUBCUTANEOUS LESION NECK (    VASECTOMY         Family History   Problem Relation Age of Onset    Cancer Maternal Grandfather        Social History     Tobacco Use    Smoking status: Former     Packs/day: 0.25     Years: 15.00     Pack years: 3.75     Types: Cigarettes     Quit date: 2010     Years since quittin.0    Smokeless tobacco: Never   Substance Use Topics    Alcohol use: Yes     Comment: rarely      Current Outpatient Medications   Medication Sig Dispense Refill    Umeclidinium-Vilanterol (ANORO ELLIPTA) 62.5-25 MCG/ACT AEPB Inhale 1 actuation into the lungs daily 60 each 1    sodium chloride (OCEAN) 0.65 % nasal spray 1 spray by Nasal route as needed for Congestion 30 mL 1    fluticasone (FLONASE) 50 MCG/ACT nasal spray 2 sprays by Each Nostril route daily 48 g 1    tadalafil (CIALIS) 10 MG tablet Take 1 tablet by mouth daily as needed for Erectile Dysfunction 30 tablet 1    sertraline (ZOLOFT) 50 MG tablet TAKE 1 TABLET BY MOUTH ONE TIME A DAY 90 tablet 1    Omega-3 Fatty Acids (FISH OIL) 1000 MG CAPS Take 1,000 mg by mouth daily      Multiple Vitamin (MULTI-VITAMINS PO) Take by mouth       No current facility-administered medications for this visit.      No Known Allergies    Health Maintenance   Topic Date Due    DTaP/Tdap/Td vaccine (1 - Tdap) Never done    Shingles vaccine (2 of 2) 2022    COVID-19 Vaccine (4 - Booster for Moderna series) 2022    Flu vaccine (1) 2022    Depression Screen  2024    A1C test (Diabetic or Prediabetic)  2024    Colorectal Cancer Screen  2024    Lipids  2025 Hepatitis C screen  Completed    HIV screen  Completed    Hepatitis A vaccine  Aged Out    Hib vaccine  Aged Out    Meningococcal (ACWY) vaccine  Aged Out    Pneumococcal 0-64 years Vaccine  Aged Out       Subjective:      Review of Systems   Constitutional:  Negative for activity change, fatigue and fever. HENT:  Negative for congestion, rhinorrhea and sore throat. Eyes:  Negative for visual disturbance. Respiratory:  Negative for chest tightness and shortness of breath. Cough: intermittent, nonproductive. Cardiovascular:  Negative for chest pain and palpitations. Gastrointestinal:  Negative for abdominal pain, diarrhea, nausea and vomiting. Endocrine: Negative for polydipsia. Genitourinary:  Negative for difficulty urinating. Musculoskeletal:  Negative for arthralgias and myalgias. Skin:  Negative for color change. Neurological:  Negative for weakness and headaches. Psychiatric/Behavioral:  Negative for behavioral problems. The patient is not nervous/anxious. All other systems reviewed and are negative. Objective:   /82   Pulse 76   Wt 205 lb (93 kg)   SpO2 97%   BMI 29.41 kg/m²   Physical Exam  Vitals reviewed. Constitutional:       General: He is not in acute distress. Appearance: Normal appearance. HENT:      Head: Normocephalic. Eyes:      Pupils: Pupils are equal, round, and reactive to light. Cardiovascular:      Rate and Rhythm: Normal rate and regular rhythm. Pulses: Normal pulses. Heart sounds: Normal heart sounds. Pulmonary:      Effort: Pulmonary effort is normal.      Breath sounds: Normal breath sounds. No wheezing or rhonchi. Abdominal:      General: There is no distension. Musculoskeletal:         General: Normal range of motion. Cervical back: Neck supple. Right lower leg: No edema. Left lower leg: No edema. Lymphadenopathy:      Cervical: No cervical adenopathy. Skin:     General: Skin is warm and dry. Capillary Refill: Capillary refill takes less than 2 seconds. Neurological:      General: No focal deficit present. Mental Status: He is alert and oriented to person, place, and time. Psychiatric:         Mood and Affect: Mood normal.         Behavior: Behavior normal.         :       Diagnosis Orders   1. Chronic obstructive pulmonary disease, unspecified COPD type ([facility]  Full PFT Study With Nazario Cam MD, Pulmonology, [facility]) 62.5-25 MCG/ACT AEPB      2. Elevated fasting blood sugar  POCT glycosylated hemoglobin (Hb A1C)      3. Screening, anemia, deficiency, iron  CBC with Auto Differential      4. Screening for cardiovascular condition  Lipid Panel      5. Screening for prostate cancer  PSA Screening      6. Screening for diabetes mellitus (DM)  Comprehensive Metabolic Panel                :          1. Chronic obstructive pulmonary disease, unspecified COPD type ([facility]  New, discussed at length. Anoro sample given in office and counseled on use. Rx sent but did discuss alternate meds if costly or not covered. Will plan for PFT and pulm f/u     - Full PFT Study With Bronchodilator; Future  - Genesis Ashley MD, Pulmonology, [facility]  - ecdinium-Vilanterol [facility]) 62.5-25 MCG/ACT AEPB; Inhale 1 actuation into the lungs daily  Dispense: 60 each; Refill: 1    2. Elevated fasting blood sugar  Hba1c WNL   - POCT glycosylated hemoglobin (Hb A1C)    3. Screening, anemia, deficiency, iron  - CBC with Auto Differential; Future    4. Screening for cardiovascular condition  - Lipid Panel; Future    5. Screening for prostate cancer  - PSA Screening; Future    6. Screening for diabetes mellitus (DM)  - Comprehensive Metabolic Panel; Future    Return if symptoms worsen or fail to improve. Patient given educational materials - see patient instructions.   Discussed use, benefit, and side effects of prescribed medications. All patient questions answered. Pt voiced understanding. Reviewed health maintenance. Instructed to continue current medications, diet and exercise. Patient agreed with treatment plan. Follow up as directed.        Electronicallysigned by BRIONNA Alicea CNP on 1/23/2023 at 11:05 AM

## 2023-02-02 ENCOUNTER — HOSPITAL ENCOUNTER (OUTPATIENT)
Dept: PULMONOLOGY | Age: 63
Discharge: HOME | End: 2023-02-02
Payer: COMMERCIAL

## 2023-02-02 DIAGNOSIS — F43.9 STRESS: ICD-10-CM

## 2023-02-02 DIAGNOSIS — J44.9 CHRONIC OBSTRUCTIVE PULMONARY DISEASE, UNSPECIFIED COPD TYPE (HCC): ICD-10-CM

## 2023-02-02 LAB
DLCO %PRED: NORMAL
DLCO PRED: NORMAL
DLCO/VA %PRED: NORMAL
DLCO/VA PRED: NORMAL
DLCO/VA: NORMAL
DLCO: NORMAL
EXPIRATORY TIME-POST: NORMAL
EXPIRATORY TIME: NORMAL
FEF 25-75% %CHNG: NORMAL
FEF 25-75% %PRED-POST: NORMAL
FEF 25-75% %PRED-PRE: NORMAL
FEF 25-75% PRED: NORMAL
FEF 25-75%-POST: NORMAL
FEF 25-75%-PRE: NORMAL
FEV1 %PRED-POST: NORMAL
FEV1 %PRED-PRE: NORMAL
FEV1 PRED: NORMAL
FEV1-POST: NORMAL
FEV1-PRE: NORMAL
FEV1/FVC %PRED-POST: NORMAL
FEV1/FVC %PRED-PRE: NORMAL
FEV1/FVC PRED: NORMAL
FEV1/FVC-POST: NORMAL
FEV1/FVC-PRE: NORMAL
FVC %PRED-POST: NORMAL
FVC %PRED-PRE: NORMAL
FVC PRED: NORMAL
FVC-POST: NORMAL
FVC-PRE: NORMAL
GAW %PRED: NORMAL
GAW PRED: NORMAL
GAW: NORMAL
IC %PRED: NORMAL
IC PRED: NORMAL
IC: NORMAL
MEP: NORMAL
MIP: NORMAL
MVV %PRED-PRE: NORMAL
MVV PRED: NORMAL
MVV-PRE: NORMAL
PEF %PRED-POST: NORMAL
PEF %PRED-PRE: NORMAL
PEF PRED: NORMAL
PEF%CHNG: NORMAL
PEF-POST: NORMAL
PEF-PRE: NORMAL
RAW %PRED: NORMAL
RAW PRED: NORMAL
RAW: NORMAL
RV %PRED: NORMAL
RV PRED: NORMAL
RV: NORMAL
SVC %PRED: NORMAL
SVC PRED: NORMAL
SVC: NORMAL
TLC %PRED: NORMAL
TLC PRED: NORMAL
TLC: NORMAL
VA %PRED: NORMAL
VA PRED: NORMAL
VA: NORMAL
VTG %PRED: NORMAL
VTG PRED: NORMAL
VTG: NORMAL

## 2023-02-02 PROCEDURE — 94729 DIFFUSING CAPACITY: CPT

## 2023-02-02 PROCEDURE — 94060 EVALUATION OF WHEEZING: CPT

## 2023-02-02 PROCEDURE — 94726 PLETHYSMOGRAPHY LUNG VOLUMES: CPT

## 2023-02-02 PROCEDURE — 6370000000 HC RX 637 (ALT 250 FOR IP): Performed by: NURSE PRACTITIONER

## 2023-02-02 RX ORDER — ALBUTEROL SULFATE 90 UG/1
2 AEROSOL, METERED RESPIRATORY (INHALATION) ONCE
Status: COMPLETED | OUTPATIENT
Start: 2023-02-02 | End: 2023-02-02

## 2023-02-02 RX ADMIN — ALBUTEROL SULFATE 2 PUFF: 90 AEROSOL, METERED RESPIRATORY (INHALATION) at 17:27

## 2023-02-02 NOTE — PROCEDURES
Impression:      Normal study with no significant response to bronchodilators.       Maya Becker MD  Pulmonary Critical Care and Sleep Medicine

## 2023-02-08 ENCOUNTER — OFFICE VISIT (OUTPATIENT)
Dept: PULMONOLOGY | Age: 63
End: 2023-02-08
Payer: COMMERCIAL

## 2023-02-08 VITALS
OXYGEN SATURATION: 97 % | TEMPERATURE: 98.2 F | HEIGHT: 70 IN | RESPIRATION RATE: 15 BRPM | DIASTOLIC BLOOD PRESSURE: 86 MMHG | WEIGHT: 205 LBS | HEART RATE: 52 BPM | SYSTOLIC BLOOD PRESSURE: 143 MMHG | BODY MASS INDEX: 29.35 KG/M2

## 2023-02-08 DIAGNOSIS — J40 BRONCHITIS: Primary | ICD-10-CM

## 2023-02-08 PROCEDURE — 99204 OFFICE O/P NEW MOD 45 MIN: CPT | Performed by: INTERNAL MEDICINE

## 2023-02-09 NOTE — PROGRESS NOTES
PULMONARY MEDICINE OUTPATIENT NOTE                                                                       PATIENT:  Kathia Barrett  YOB: 1960  MRN: [id number]     REFERRED BY: BRIONNA Montoya CNP   CHIEF COMPLIANT: COPD (New Patient)       HISTORY     Kathia Barrett is a 58y.o. year old male here for establishing care/follow-up    INITIAL VISIT: 2/8/2023  LAST VISIT:    HISTORY OF PRESENT ILLNESS: Patient reports that he was referred to pulm clinic after his recent chest x-ray was reported to have COPD. He is a former smoker, almost 15-pack-year smoking history. Quit 13 years back. Denies any cough, sputum or significant exertional dyspnea. He reports having severe bout of bronchitis, that took several weeks to clear. Reports some seasonal allergies and sinus congestion. He was prescribed Anoro Ellipta during bout of bronchitis. He is not using any bronchodilators at this point. PFT (2/2/2023) does not show any significant ventilatory impairment. CURRENT PULMONARY PROBLEM LIST:  1.  Bronchitis       CURRENT SYMPTOMS:  See HPI    PULMONARY COMORBIDITIES/RISK FACTORS Yes No   NASO-BRONCHIAL/ENVIRONMENTAL ALLERGIES [x] []   ASTHMA [] [x]   CHRONIC SINUSITIS/POSTNASAL DRIP [x] []   GERD/ACID REFLUX [] [x]   ASPIRIN USE [] [x]   CORONARY ARTERY DISEASE  []  [x]   CONGESTIVE CARDIAC FAILURE [] [x]   ATRIAL FIBRILLATION [] [x]   AMIODARONE USE [] [x]   PULMONARY HYPERTENSION [] [x]   VENOUS THROMBOEMBOLISM [] [x]   CHRONIC LIVER DISEASE/CIRRHOSIS [] [x]   CONNECTIVE TISSUE DISORDER [] [x]   MORBID OBESITY [] [x]   AMBULATORY OXYGEN USE [] [x]   NOCTURNAL CPAP USE [] [x]   NOCTURNAL BIPAP/NIV USE [] [x]     CURRENT RESPIRATORY MEDS:    LAMA  [] Spiriva HandiHaler (tiotropium bromide)  [] Spiriva Respimat (tiotropium bromide)  [] Incruse Ellipta (umeclidinium powder)  [] Tudorza Pressair (aclidinium bromide powder)  [] Buzzy Salter Neohaler (glycopyrrolate powder) LAMA  [] Spiriva HandiHaler (tiotropium bromide)  [] Spiriva Respimat (tiotropium bromide) 1.25/2.5  [] Incruse Ellipta (umeclidinium powder)  [] Tudorza Pressair (aclidinium bromide powder)  [] Vin Montes Neohaler (glycopyrrolate powder)   ICS  [] Arnuity Ellipta (fluticasone furoate)  [] Flovent HFA (fluticasone propionate)  [] Flovent Diskus (fluticasone propionate powder)  [] Asmanex HFA (mometasone furoate)  [] Asmanex Twistihaler (mometasone furoate powder)  [] Pulmicort Flexihaler (budesonide powder)  [] Qvar HFA (beclomethasone dipropionate)  [] Alvesco HFA (Ciclesonide)  [] Aerospan (flunisolide) LAMA/LABA combination  [] Stilolto Respimat (tiotropium bromide/olodaterol)  [] Anoro Ellipta (umeclidinium/Vilanterol powder)  [] Bevespi Aerosphere (glycopyrrolate/formoterol)  [] Utibron Neohaler (glycopyrrolate/indacaterol)   ICS/LABA combination  [] Advair Diskus (fluticasone/salmeterol)   [] Advair HFA (fluticasone/salmterol)   [] AirDuo Respiclick (fluticasone/salmeterol)  [] Generic fluticasone/salmeterol inhalation powder  [] Dulera (mometasone and formoterol)  [] Symbicort (budesonide/formoterol)   [] Breo Ellipta (fluticasone/vilanterol)  ICS/ LAMA/LAMA combination  [] Breztri Aerosphere (budesonide/formoterol fumarate/glycopyrrolate)  [] Trelegy Ellipta (fluticasone furoate/vilanterol/umeclidinium)   KAYLA  [] ProAir HFA (albuterol sulphate)  [] ProAir Respiclick (albuterol sulphate powder)  [] Proventil HFA (albuterol sulphate)  [] Ventolin HFA (albuterol sulphate)  [] Xopenex HFA (levalbuterol tartrate) CAR/KAYLA combination  [] Combivent Respimat- (ipratropium bromide/albuterol sulfate)   CAR  [] Atrovent HFA (ipratropium bromide) OTHERS  [] Oral corticosteroid  [] Roflumilast  [] Montelukast  [] Theophylline  [x] Nasal corticosteroids  [x] OTC antihistamines       TOBACCO HISTORY:  He  reports that he quit smoking about 13 years ago. His smoking use included cigarettes. He started smoking about 41 years ago.  He has a 14.00 pack-year smoking history.  He has never used smokeless tobacco.    AVOCATION/OCCUPATIONAL EXPOSURE:  [] Asbestos [] Silica dust [] Coal [] Foundry [] [employer] [] [employer] [] Pets [] Exotic birds   [] Tuberculosis [] Hot tub  [] Drugs [] Others    PAST MEDICAL HISTORY:      Diagnosis Date    Adenomatous polyp     Arthritis     Diverticulosis     Hemorrhoids     High cholesterol 7/11/2016     PAST SURGICAL HISTORY:      Procedure Laterality Date    COLONOSCOPY  04-15-16    ADENOMATOUS POLYP, DIVERTICULOSIS, INTERNAL AND EXTERNAL HEMORRHOIDS     COLONOSCOPY  02/12/2021    COLONOSCOPY N/A 2/12/2021    COLORECTAL CANCER SCREENING, NOT HIGH RISK performed by Jaren Salazar DO at [facility] Right     KNEE SURGERY Right     NECK SURGERY N/A 1/31/2020    EXCISION SUBCUTANEOUS LESION NECK performed by Otoniel Slaughter MD at [facility]  01/31/2020    EXCISION SUBCUTANEOUS LESION NECK (    VASECTOMY          PHYSICAL EXAMINATION      VITAL SIGNS:   BP (!) 143/86 (Site: Left Upper Arm, Position: Sitting, Cuff Size: Large Adult)   Pulse 52   Temp 98.2 °F (36.8 °C) (Temporal)   Resp 15   Ht 5' 10\" (1.778 m)   Wt 205 lb (93 kg)   SpO2 97%   BMI 29.41 kg/m²   Wt Readings from Last 3 Encounters:   02/08/23 205 lb (93 kg)   01/23/23 205 lb (93 kg)   01/09/23 210 lb 6.4 oz (95.4 kg)        SYSTEMIC EXAMINATION:  General appearance -  [x] well appearing  [x] overweight  [] obese   [] morbidly obese [] cachectic [x] comfortable  [x] in no acute distress  [] chronically ill-appearing  [] in mild to moderate respiratory distress  Mental status -  [x] alert  [x] oriented to person, place, and time  [] anxious  [] depressed mood   Head-  [x] atraumatic  [x] normocephalic  Eyes -  [] pupils equal and reactive    [] extraocular eye movements intact  [x] sclera anicteric  Ears -  [x] hearing grossly normal bilaterally [] bilateral TM's and external ear canals normal  Nose - [x] normal and patent [] no erythema  [] discharge  Mouth -  [x] mucous membranes moist  [] pharynx normal without lesions [] erythematous  [] exudate noted  Mallampati Airway Score -  [] I (soft palate, uvula, fauces, tonsillar pillars visible) [] II (soft palate, uvula, fauces visible) []  III (soft palate, base of uvula visible) [] IV (only hard palate visible)  Neck -  [x] supple  [] no significant adenopathy  [] carotids upstroke normal bilaterally [] no JVD  [] no bruit  Lymphatics -  [x] no palpable lymphadenopathy  [] no hepatosplenomegaly  Chest -   [x] normal chest excursion  [x] no chest wall tenderness  [] increased AP diameter[] pectus noted [] scoliosis noted [x] no tachypnea retraction or cyanosis [x] clear to auscultation, no wheezes, rales or rhonchi, symmetric air entry  [] wheezing noted  [] rales noted  []rhonchi noted [] decreased air entry noted bilaterally  Heart -  [x] normal rate,  [x] regular rhythm  [] irregularly irregular rhythm [x] normal S1  [x] normal S2  [x] no murmurs, rubs, clicks or gallops  [] S3 present  [] S4 present  [] systolic murmur  [] diastolic murmur  [] midsystolic click []pericardial rub present   Abdomen -  [] soft [] nontender  [] nondistended  [] no masses or organomegaly  Neurological -  [x] normal speech  [x] no focal findings or movement disorder noted  [] cranial nerves II through XII intact  [] DTR's normal and symmetric  [] Babinski sign negative,  [x] motor and sensory grossly normal bilaterally  [] normal muscle tone [x] no tremors  [] strength 5/5  [] Romberg sign negative [] normal gait   Musculoskeletal -  [x] no joint tenderness [x] no deformity or swelling  Extremities - [x] no clubbing [x] no cyanosis [x] no pedal edema [] pedal edema [] intact peripheral pulses  Skin -  [x] normal coloration and turgor  [x] no rashes  [] no suspicious skin lesions noted       MEDICAL DECISION MAKING     PROBLEMS ADDRESSED (NUMBER AND COMPLEXITY)       ICD-10-CM    1. Bronchitis  J40 CT CHEST WO CONTRAST              DATA REVIEWED AND ANALYZED (AMOUNT AND/OR COMPLEXITY)     LABORATORY DATA:    [] Ordered [] Unavailable [x] Reviewed [] Independently interpreted    ABG:   No results found for: PH, PHART, POCPH, PCO2, LQH3CUK, POCPCO2, PO2, PO2ART, POCPO2, HCO3, TJQ1NAO, POCHCO3, BE, BEART, O2SAT, K9VLJXYO, AAQO8FGJ  VBG:  No results found for: PHVEN, CQE9YHJ, BEVEN, Q6EUZXMV  CBC:   Lab Results   Component Value Date    WBC 7.4 02/17/2020    RBC 4.60 02/17/2020    HGB 15.2 02/17/2020    HCT 46.0 02/17/2020     02/17/2020    .0 02/17/2020    MCH 33.0 02/17/2020    MCHC 33.0 02/17/2020    RDW 13.4 02/17/2020    LYMPHOPCT 25 02/17/2020    MONOPCT 6 02/17/2020    BASOPCT 1 02/17/2020    MONOSABS 0.46 02/17/2020    LYMPHSABS 1.82 02/17/2020    EOSABS 0.14 02/17/2020    BASOSABS 0.05 02/17/2020    DIFFTYPE NOT REPORTED 02/17/2020     Eosinophil Count: No data recorded  IgE: No results found for: IGE  Allergen panel:No results found for: REGVALL  Alpha-1 antitrypsin:No results found for: A1A  CMP:   Lab Results   Component Value Date     02/17/2020    K 3.8 02/17/2020     (H) 02/17/2020    CO2 21 02/17/2020    BUN 19 02/17/2020    CREATININE 0.83 02/17/2020    GLUCOSE 128 (H) 02/17/2020    CALCIUM 9.4 02/17/2020    PROT 6.2 (L) 02/17/2020    LABALBU 3.8 02/17/2020    BILITOT <0.10 (L) 02/17/2020    ALT 46 (H) 02/17/2020    AST 28 02/17/2020    ALKPHOS 56 02/17/2020     Coagulation Profile:   No results found for: INR, PROTIME, APTT  BNP:   No results found for: BNP, PROBNP  D-Dimer:  No results found for: DDIMER  Others labs:  No results found for: TSH, L1MAYQD, P3CZNOY, THYROIDAB, FT3, T4FREE  No results found for: SEDRATE, CRP, C3, C4, CH50, JUANA, ANATITER, RHEUMFACTOR, RF, MPO, PR3  No results found for: IRON, TIBC, FERRITIN, FOLATE, LDH  No results found for: SPEP, UPEP, PSA, CEA, , UG3096,   No results found for: RPR, HIV     PULMONARY FUNCTION TESTS:    [] Ordered [] Unavailable [x] Reviewed [x] Independently interpreted    PULMONARY FUNCTION TEST:  6-MINUTE WALK TEST:  FeNO:  NOCTURNAL PULSE OXIMETRY:  BRONCHOPROVOCATION TEST:  EXERCISE PFT:  PULMONARY REHAB:               SLEEP STUDY/COMPLIANCE DATA:    [] Ordered [x] Unavailable [] Reviewed [] Independently interpreted    BASELINE SLEEP STUDY:  SPLIT NIGHT STUDY:  TITRATION STUDY:  CPAP/BIPAP COMPLIANCE DATA:         IMAGING DATA:    [] Ordered [] Unavailable [x] Reviewed  [x] Independently interpreted    CHEST X-RAY: 1/6/2023  FINDINGS:   Lungs are hyperinflated suggesting COPD. No focal consolidation. No   pneumothorax or pleural effusion. Cardiac and mediastinal silhouettes   unremarkable. No acute osseous abnormality. Impression   COPD. No acute findings.      CT SCAN:  PET/CT SCAN:         CARDIOVASCULAR DATA:    [] Ordered [x] Unavailable [] Reviewed  [] Independently interpreted    ECHOCARDIOGRAM:  RIGHT HEART CATH:  CORONARY ANGIOGRAM:    PATHOLOGY/MICROBIOLOGY DATA:    [] Ordered [] Unavailable [] Reviewed  [] Independently interpreted    ORDERS PLACED:  Orders Placed This Encounter   Procedures    CT CHEST WO CONTRAST     Standing Status:   Future     Standing Expiration Date:   2/8/2024     Order Specific Question:   Reason for exam:     Answer:   r/o pneumonia       CODING:    CATEGORY: 1  REVIEW OF PRIOR EXTERNAL NOTE(S) FROM EACH UNIQUE SOURCE:  [] Unavailable [] Requested [x] Reviewed     REVIEW OF THE RESULT(S) OF UNIQUE TEST:  [] Unavailable [x] Reviewed     ORDERING OF EACH UNIQUE TEST:  [x] Ordered [] Not applicable    ASSESSMENT REQUIRING AN INDEPENDENT HISTORIAN(S):  [] Yes [x] No    CATEGORY: 2  INDEPENDENT INTERPRETATION OF A TEST PERFORMED BY ANOTHER PHYSICIAN/OTHER QUALIFIED HEALTHCARE PROFESSIONAL (NOT SEPARATELY REPORTED):  [x] Yes [] No    CATEGORY: 3  DISCUSSION OF MANAGEMENT OR TEST INTERPRETATION WITH EXTERNAL PHYSICIAN/OTHER QUALIFIED HEALTHCARE PROFESSIONAL/APPROPRIATE SOURCE (NOT  REPORTED):  [] Yes [x] No          RISK OF COMPLICATIONS AND/OR MORBIDITY OR MORTALITY OF PATIENT MANAGEMENT     PRESCRIPTION DRUG MANAGEMENT/RECOMMENDATIONS:  ALLERGIES:  No Known Allergies   MEDICATIONS:  Outpatient Medications Prior to Visit   Medication Sig Dispense Refill    sertraline (ZOLOFT) 50 MG tablet TAKE 1 TABLET BY MOUTH ONE TIME DAILY 90 tablet 1    Umeclidinium-Vilanterol (ANORO ELLIPTA) 62.5-25 MCG/ACT AEPB Inhale 1 actuation into the lungs daily 60 each 1    sodium chloride (OCEAN) 0.65 % nasal spray 1 spray by Nasal route as needed for Congestion 30 mL 1    fluticasone (FLONASE) 50 MCG/ACT nasal spray 2 sprays by Each Nostril route daily 48 g 1    tadalafil (CIALIS) 10 MG tablet Take 1 tablet by mouth daily as needed for Erectile Dysfunction 30 tablet 1    Omega-3 Fatty Acids (FISH OIL) 1000 MG CAPS Take 1,000 mg by mouth daily      Multiple Vitamin (MULTI-VITAMINS PO) Take by mouth       No facility-administered medications prior to visit.        Patient has been previously prescribed Anoro which she quit using after symptoms improved  Currently noted to be asymptomatic from pulmonary standpoint and is not on any bronchodilators  PFT did not show any significant ventilatory impairment  Reports sinus congestion and using fluticasone nasal spray    IMAGING RECOMMENDATIONS/NEED FOR LUNG CANCER SCREENING RECOMMENDATIONS:  X-ray findings discussed with the patient  We will get a CT chest for further evaluation of lung parenchyma    After reviewing the patient's smoking history, age and other clinical data, patient DOES NOT meet the following Low Dose CT (LDCT) Lung Screening criteria:    [] Age: Screening recommended if age range is 55-77(Medicare) or 50-80 (USPSTF)  [x] Pack-yr: Screening recommended if pack year smoking >20  [] Duration since quit: Screening recommended if still smoking or less than 15 year since quit  [] Lung cancer: Screening not recommended if there are sign or symptoms of lung cancer   [] Duration since last CT: Screening recommended if > 11 months since last LDCT         SOCIAL HISTORY/SOCIAL DETERMINANTS OF HEALTH:  Social Determinants of Health with Concerns     Tobacco Use: Medium Risk    Smoking Tobacco Use: Former    Smokeless Tobacco Use: Never    Passive Exposure: Not on file   Alcohol Use: Not on file   Transportation Needs: Not on file   Physical Activity: Not on file   Stress: Not on file   Social Connections: Not on file   Intimate Partner Violence: Not on file   Housing Stability: Not on file       SMOKING CESSATION RECOMMENDATIONS/COUNSELING:  Patient to continue smoking cessation    LIFESTYLE MODIFICATION RECOMMENDATIONS/COUNSELING:  Follow healthy behaviors: nutrition, exercise and medication adherence  Maintain an active lifestyle    IMMUNIZATION HISTORY/RECOMMENDATIONS:    Immunization History   Administered Date(s) Administered    COVID-19, MODERNA BLUE border, Primary or Immunocompromised, (age 12y+), IM, 100 mcg/0.5mL 03/04/2021, 04/01/2021    COVID-19, MODERNA Booster BLUE border, (age 18y+), IM, 50mcg/0.25mL 12/06/2021    Influenza Vaccine, unspecified formulation 09/19/2017    Influenza Virus Vaccine 11/03/2015    Influenza, AFLURIA (age 1 yrs+), FLUZONE, (age 10 mo+), MDV, 0.5mL 12/08/2016, 09/19/2017    Influenza, FLUARIX, FLULAVAL, FLUZONE (age 10 mo+) AND AFLURIA, (age 1 y+), PF, 0.5mL 11/26/2018, 01/20/2020, 01/22/2021    Zoster Recombinant (Shingrix) 12/06/2021     Recommend influenza vaccination in the fall annually  Recommendations were given regarding pneumococcal and COVID-19 vaccination         All the questions that the patient had were answered to his satisfaction  We'll see the patient back in the clinic as needed  Thank you for having us involved in the care of your patient  Please call us if you have any questions or concerns    Terrell Virgen MD  Pulmonary and Critical Care Medicine             This note is created with the assistance of a speech recognition program.  While intending to generate a document that actually reflects the content of the visit, the document can still have some errors including those of syntax and sound-alike substitutions which may escape proof reading. It such instances, actual meaning can be extrapolated by contextual diversion.

## 2023-02-14 ENCOUNTER — HOSPITAL ENCOUNTER (OUTPATIENT)
Dept: CT IMAGING | Age: 63
Discharge: HOME | End: 2023-02-16
Payer: COMMERCIAL

## 2023-02-14 DIAGNOSIS — J40 BRONCHITIS: ICD-10-CM

## 2023-02-14 PROCEDURE — 71250 CT THORAX DX C-: CPT

## 2023-02-23 ENCOUNTER — OFFICE VISIT (OUTPATIENT)
Dept: PRIMARY CARE CLINIC | Age: 63
End: 2023-02-23
Payer: COMMERCIAL

## 2023-02-23 VITALS
OXYGEN SATURATION: 94 % | WEIGHT: 207 LBS | DIASTOLIC BLOOD PRESSURE: 78 MMHG | HEART RATE: 85 BPM | BODY MASS INDEX: 29.7 KG/M2 | SYSTOLIC BLOOD PRESSURE: 130 MMHG

## 2023-02-23 DIAGNOSIS — Z87.898 H/O SHORTNESS OF BREATH: Primary | ICD-10-CM

## 2023-02-23 PROCEDURE — 99213 OFFICE O/P EST LOW 20 MIN: CPT | Performed by: NURSE PRACTITIONER

## 2023-02-23 SDOH — ECONOMIC STABILITY: HOUSING INSECURITY
IN THE LAST 12 MONTHS, WAS THERE A TIME WHEN YOU DID NOT HAVE A STEADY PLACE TO SLEEP OR SLEPT IN A SHELTER (INCLUDING NOW)?: NO

## 2023-02-23 SDOH — ECONOMIC STABILITY: FOOD INSECURITY: WITHIN THE PAST 12 MONTHS, THE FOOD YOU BOUGHT JUST DIDN'T LAST AND YOU DIDN'T HAVE MONEY TO GET MORE.: NEVER TRUE

## 2023-02-23 SDOH — ECONOMIC STABILITY: INCOME INSECURITY: HOW HARD IS IT FOR YOU TO PAY FOR THE VERY BASICS LIKE FOOD, HOUSING, MEDICAL CARE, AND HEATING?: NOT HARD AT ALL

## 2023-02-23 SDOH — ECONOMIC STABILITY: TRANSPORTATION INSECURITY
IN THE PAST 12 MONTHS, HAS LACK OF TRANSPORTATION KEPT YOU FROM MEETINGS, WORK, OR FROM GETTING THINGS NEEDED FOR DAILY LIVING?: NO

## 2023-02-23 SDOH — ECONOMIC STABILITY: FOOD INSECURITY: WITHIN THE PAST 12 MONTHS, YOU WORRIED THAT YOUR FOOD WOULD RUN OUT BEFORE YOU GOT MONEY TO BUY MORE.: NEVER TRUE

## 2023-02-23 ASSESSMENT — ENCOUNTER SYMPTOMS
SHORTNESS OF BREATH: 0
DIARRHEA: 0
CHEST TIGHTNESS: 0
ABDOMINAL PAIN: 0
SORE THROAT: 0
COLOR CHANGE: 0
NAUSEA: 0
RHINORRHEA: 0
VOMITING: 0

## 2023-02-23 ASSESSMENT — PATIENT HEALTH QUESTIONNAIRE - PHQ9
2. FEELING DOWN, DEPRESSED OR HOPELESS: 0
SUM OF ALL RESPONSES TO PHQ QUESTIONS 1-9: 0
SUM OF ALL RESPONSES TO PHQ QUESTIONS 1-9: 0
SUM OF ALL RESPONSES TO PHQ9 QUESTIONS 1 & 2: 0
SUM OF ALL RESPONSES TO PHQ QUESTIONS 1-9: 0
SUM OF ALL RESPONSES TO PHQ QUESTIONS 1-9: 0
1. LITTLE INTEREST OR PLEASURE IN DOING THINGS: 0

## 2023-02-23 NOTE — PROGRESS NOTES
[address] PRIMARY CARE  . [address]  [address] [address]  Dept: [phone]  Dept Fax: [phone]    Erick Rader is a 58 y.o. male who presentstoday for his medical conditions/complaints as noted below. Erick Rader is c/o of  Chief Complaint   Patient presents with    Breathing Problem     Seeing pulm.  No dx of COPD          HPI:     Here for f/u for SOB  Saw pulmonology and ruled out COPD, likely sequela of acute illness   Feels well today, no complaints   No longer using inhaler    Has open orders for labs       Hemoglobin A1C (%)   Date Value   2023 5.5   2018 5.7             ( goal A1C is < 7)   No results found for: LABMICR  LDL Cholesterol (mg/dL)   Date Value   2020 158 (H)   07/10/2016 145 (H)       (goal LDL is <100)   AST (U/L)   Date Value   2020 28     ALT (U/L)   Date Value   2020 46 (H)     BUN (mg/dL)   Date Value   2020 19     BP Readings from Last 3 Encounters:   23 130/78   23 (!) 143/86   23 128/82          ([id number])    Past Medical History:   Diagnosis Date    Adenomatous polyp     Arthritis     Diverticulosis     Hemorrhoids     High cholesterol 2016      Past Surgical History:   Procedure Laterality Date    COLONOSCOPY  04-15-16    ADENOMATOUS POLYP, DIVERTICULOSIS, INTERNAL AND EXTERNAL HEMORRHOIDS     COLONOSCOPY  2021    COLONOSCOPY N/A 2021    COLORECTAL CANCER SCREENING, NOT HIGH RISK performed by Franklyn Kendrick DO at [address] Right     KNEE SURGERY Right     NECK SURGERY N/A 2020    EXCISION SUBCUTANEOUS LESION NECK performed by Kristy Garcia MD at . [address]  2020    EXCISION SUBCUTANEOUS LESION NECK (    VASECTOMY         Family History   Problem Relation Age of Onset    Cancer Maternal Grandfather        Social History     Tobacco Use    Smoking status: Former     Packs/day: 0.50     Years: 28.00     Pack years: 14.00     Types: Cigarettes     Start date:      Quit date: 2010     Years since quittin.1    Smokeless tobacco: Never   Substance Use Topics    Alcohol use: Yes     Comment: socially      Current Outpatient Medications   Medication Sig Dispense Refill    sertraline (ZOLOFT) 50 MG tablet TAKE 1 TABLET BY MOUTH ONE TIME DAILY 90 tablet 1    sodium chloride (OCEAN) 0.65 % nasal spray 1 spray by Nasal route as needed for Congestion 30 mL 1    fluticasone (FLONASE) 50 MCG/ACT nasal spray 2 sprays by Each Nostril route daily 48 g 1    tadalafil (CIALIS) 10 MG tablet Take 1 tablet by mouth daily as needed for Erectile Dysfunction 30 tablet 1    Omega-3 Fatty Acids (FISH OIL) 1000 MG CAPS Take 1,000 mg by mouth daily      Multiple Vitamin (MULTI-VITAMINS PO) Take by mouth       No current facility-administered medications for this visit. No Known Allergies    Health Maintenance   Topic Date Due    Pneumococcal 0-64 years Vaccine (1 - PCV) Never done    DTaP/Tdap/Td vaccine (1 - Tdap) Never done    Shingles vaccine (2 of 2) 2022    COVID-19 Vaccine (4 - Booster for Moderna series) 2022    Flu vaccine (1) 2022    A1C test (Diabetic or Prediabetic)  2024    Depression Screen  2024    Colorectal Cancer Screen  2024    Lipids  2025    Hepatitis C screen  Completed    HIV screen  Completed    Hepatitis A vaccine  Aged Out    Hib vaccine  Aged Out    Meningococcal (ACWY) vaccine  Aged Out       Subjective:      Review of Systems   Constitutional:  Negative for activity change, fatigue and fever. HENT:  Negative for congestion, rhinorrhea and sore throat. Eyes:  Negative for visual disturbance. Respiratory:  Negative for chest tightness and shortness of breath. Cardiovascular:  Negative for chest pain and palpitations. Gastrointestinal:  Negative for abdominal pain, diarrhea, nausea and vomiting. Endocrine: Negative for polydipsia. Genitourinary:  Negative for difficulty urinating. Musculoskeletal:  Negative for arthralgias and myalgias. Skin:  Negative for color change. Neurological:  Negative for weakness and headaches. Psychiatric/Behavioral:  Negative for behavioral problems. The patient is not nervous/anxious. All other systems reviewed and are negative. Objective:   /78   Pulse 85   Wt 207 lb (93.9 kg)   SpO2 94%   BMI 29.70 kg/m²   Physical Exam  Vitals reviewed. Constitutional:       General: He is not in acute distress. Appearance: Normal appearance. HENT:      Head: Normocephalic. Eyes:      Pupils: Pupils are equal, round, and reactive to light. Cardiovascular:      Rate and Rhythm: Normal rate and regular rhythm. Pulses: Normal pulses. Heart sounds: Normal heart sounds. Pulmonary:      Effort: Pulmonary effort is normal.      Breath sounds: Normal breath sounds. Abdominal:      General: There is no distension. Musculoskeletal:         General: Normal range of motion. Right lower leg: No edema. Left lower leg: No edema. Skin:     General: Skin is warm and dry. Capillary Refill: Capillary refill takes less than 2 seconds. Neurological:      General: No focal deficit present. Mental Status: He is alert and oriented to person, place, and time. Psychiatric:         Mood and Affect: Mood normal.         Behavior: Behavior normal.         :       Diagnosis Orders   1. H/O shortness of breath                  :          1. H/O shortness of breath  Resolved, no COPD. Normal PFT with pulm    Return if symptoms worsen or fail to improve. Patient given educational materials - see patient instructions. Discussed use, benefit, and side effects of prescribed medications. All patient questions answered. Pt voiced understanding. Reviewed health maintenance. Instructed to continue current medications, diet and exercise. Patient agreed with treatment plan. Follow up as directed.        Electronicallysigned by BRIONNA Bello CNP on 2/23/2023 at 10:43 AM

## 2023-08-04 DIAGNOSIS — F43.9 STRESS: ICD-10-CM

## 2023-08-30 DIAGNOSIS — N52.9 ERECTILE DYSFUNCTION, UNSPECIFIED ERECTILE DYSFUNCTION TYPE: ICD-10-CM

## 2023-08-31 RX ORDER — TADALAFIL 10 MG/1
10 TABLET ORAL DAILY PRN
Qty: 30 TABLET | Refills: 1 | Status: SHIPPED | OUTPATIENT
Start: 2023-08-31

## 2023-11-30 ENCOUNTER — OFFICE VISIT (OUTPATIENT)
Dept: FAMILY MEDICINE CLINIC | Age: 63
End: 2023-11-30
Payer: COMMERCIAL

## 2023-11-30 VITALS
BODY MASS INDEX: 31.28 KG/M2 | OXYGEN SATURATION: 96 % | RESPIRATION RATE: 14 BRPM | DIASTOLIC BLOOD PRESSURE: 78 MMHG | SYSTOLIC BLOOD PRESSURE: 136 MMHG | HEART RATE: 85 BPM | TEMPERATURE: 97.7 F | WEIGHT: 218 LBS

## 2023-11-30 DIAGNOSIS — M76.62 LEFT ACHILLES TENDINITIS: ICD-10-CM

## 2023-11-30 DIAGNOSIS — H60.391 INFECTION OF RIGHT EAR LOBE: Primary | ICD-10-CM

## 2023-11-30 DIAGNOSIS — H65.191 ACUTE MEE (MIDDLE EAR EFFUSION), RIGHT: ICD-10-CM

## 2023-11-30 PROCEDURE — 99214 OFFICE O/P EST MOD 30 MIN: CPT | Performed by: NURSE PRACTITIONER

## 2023-11-30 RX ORDER — AMOXICILLIN AND CLAVULANATE POTASSIUM 875; 125 MG/1; MG/1
1 TABLET, FILM COATED ORAL 2 TIMES DAILY
Qty: 20 TABLET | Refills: 0 | Status: SHIPPED | OUTPATIENT
Start: 2023-11-30 | End: 2023-12-10

## 2023-11-30 RX ORDER — PREDNISONE 20 MG/1
20 TABLET ORAL 2 TIMES DAILY
Qty: 10 TABLET | Refills: 0 | Status: SHIPPED | OUTPATIENT
Start: 2023-11-30 | End: 2023-12-05

## 2023-11-30 ASSESSMENT — ENCOUNTER SYMPTOMS
VOMITING: 0
BACK PAIN: 0
NAUSEA: 0
COLOR CHANGE: 1

## 2023-11-30 NOTE — PROGRESS NOTES
PEF %Pred-Pre      PEF-Post      PEF %Pred-Post      PEF %Change      MVV-Pre      MVV Pred      MVV %Pred-Pre      DLCO      DLCO Pred      DLCO %Pred      DLCO/VA      DLCO/VA Pred      DLCO/VA %Pred      VA      VA Pred      VA %Pred      Raw      Raw Pred      Raw %Pred      Gaw      GAW PRED      Gaw %Pred      SVC      SVC Pred      SVC %Pred      TLC      TLC Pred      TLC %Pred      RV      RV Pred      RV %Pred      IC      IC Pred      IC %Pred      VTG      VTG Pred      VTG %Pred      MIP      MEP         Infected right earlobe with right CAMILA- Augmentin and Prednisone as prescribed. Left achilles tendonitis- referral placed to ortho who has he seen in the past.   Pt to return if symptoms are not improving or worsening. Go to the ER for any emergent concern. Patient given educational materials - see patientinstructions. Discussed use, benefit, and side effects of prescribed medications. All patient questions answered. Pt verbalized understanding. Instructed to continue current medications, diet and exercise. Patient agreed with treatment plan. Follow up as directed.      Electronically signed by BRIONNA Lora CNP on 11/30/2023 at 5:43 PM

## 2024-01-01 SDOH — HEALTH STABILITY: PHYSICAL HEALTH: ON AVERAGE, HOW MANY MINUTES DO YOU ENGAGE IN EXERCISE AT THIS LEVEL?: 40 MIN

## 2024-01-01 SDOH — HEALTH STABILITY: PHYSICAL HEALTH: ON AVERAGE, HOW MANY DAYS PER WEEK DO YOU ENGAGE IN MODERATE TO STRENUOUS EXERCISE (LIKE A BRISK WALK)?: 2 DAYS

## 2024-01-02 ENCOUNTER — OFFICE VISIT (OUTPATIENT)
Dept: ORTHOPEDIC SURGERY | Age: 64
End: 2024-01-02
Payer: COMMERCIAL

## 2024-01-02 VITALS — BODY MASS INDEX: 31.07 KG/M2 | WEIGHT: 217 LBS | HEIGHT: 70 IN

## 2024-01-02 DIAGNOSIS — M76.62 ACHILLES TENDINITIS OF LEFT LOWER EXTREMITY: Primary | ICD-10-CM

## 2024-01-02 PROCEDURE — 99203 OFFICE O/P NEW LOW 30 MIN: CPT | Performed by: ORTHOPAEDIC SURGERY

## 2024-01-03 NOTE — PROGRESS NOTES
This 63-year-old patient is seen here complaining of pain over the back of the left tendo Achillis.  He states this has been going on for about 6 months.  He had similar problem in the right side about a year ago and now is feeling much better.  Pain is mostly when he is ambulating.  There is no numbness or tingling.  There is no history of any injury.    Examination: His gait and stance were normal.  He has normal ankle motions.  There is definite thickening and swelling over the tendo Achilles.  There is no sign of cellulitis.  There is no increased heat.    Diagnosis: Left tendo Achilles tendinitis.    Treatment: I have given him a 1/8 of an inch lift in the left shoe.  He will try that out and if that does not help very much then he will use the half inch lift.  So that he is not out of balance he will also use 1 on the other side but I did not have the same size so he will contact to fit into his shoe.    He may do some stretching exercises.  Will see him back as needed.  Patient given disc 3 4 weeks and if the pain does not subside then he may return here.

## 2024-01-17 DIAGNOSIS — N52.9 ERECTILE DYSFUNCTION, UNSPECIFIED ERECTILE DYSFUNCTION TYPE: ICD-10-CM

## 2024-01-17 RX ORDER — TADALAFIL 10 MG/1
10 TABLET ORAL DAILY PRN
Qty: 30 TABLET | Refills: 1 | Status: SHIPPED | OUTPATIENT
Start: 2024-01-17

## 2024-01-19 ENCOUNTER — HOSPITAL ENCOUNTER (OUTPATIENT)
Facility: CLINIC | Age: 64
Discharge: HOME | End: 2024-01-19
Payer: COMMERCIAL

## 2024-01-19 ENCOUNTER — PATIENT MESSAGE (OUTPATIENT)
Dept: PRIMARY CARE CLINIC | Age: 64
End: 2024-01-19

## 2024-01-19 DIAGNOSIS — Z13.6 SCREENING FOR CARDIOVASCULAR CONDITION: ICD-10-CM

## 2024-01-19 DIAGNOSIS — Z13.1 SCREENING FOR DIABETES MELLITUS (DM): ICD-10-CM

## 2024-01-19 DIAGNOSIS — Z12.5 SCREENING FOR PROSTATE CANCER: ICD-10-CM

## 2024-01-19 DIAGNOSIS — Z13.0 SCREENING, ANEMIA, DEFICIENCY, IRON: ICD-10-CM

## 2024-01-19 DIAGNOSIS — Z00.00 ANNUAL PHYSICAL EXAM: Primary | ICD-10-CM

## 2024-01-19 DIAGNOSIS — Z00.00 ANNUAL PHYSICAL EXAM: ICD-10-CM

## 2024-01-19 LAB
BASOPHILS # BLD: 0.03 K/UL (ref 0–0.2)
BASOPHILS NFR BLD: 1 % (ref 0–2)
EOSINOPHIL # BLD: 0.16 K/UL (ref 0–0.44)
EOSINOPHILS RELATIVE PERCENT: 3 % (ref 1–4)
ERYTHROCYTE [DISTWIDTH] IN BLOOD BY AUTOMATED COUNT: 13.5 % (ref 11.8–14.4)
HCT VFR BLD AUTO: 48.3 % (ref 40.7–50.3)
HGB BLD-MCNC: 16.1 G/DL (ref 13–17)
IMM GRANULOCYTES # BLD AUTO: <0.03 K/UL (ref 0–0.3)
IMM GRANULOCYTES NFR BLD: 0 %
LYMPHOCYTES NFR BLD: 1.29 K/UL (ref 1.1–3.7)
LYMPHOCYTES RELATIVE PERCENT: 22 % (ref 24–43)
MCH RBC QN AUTO: 33.4 PG (ref 25.2–33.5)
MCHC RBC AUTO-ENTMCNC: 33.3 G/DL (ref 28.4–34.8)
MCV RBC AUTO: 100.2 FL (ref 82.6–102.9)
MONOCYTES NFR BLD: 0.44 K/UL (ref 0.1–1.2)
MONOCYTES NFR BLD: 8 % (ref 3–12)
NEUTROPHILS NFR BLD: 66 % (ref 36–65)
NEUTS SEG NFR BLD: 3.86 K/UL (ref 1.5–8.1)
NRBC BLD-RTO: 0 PER 100 WBC
PLATELET # BLD AUTO: 260 K/UL (ref 138–453)
PMV BLD AUTO: 10.9 FL (ref 8.1–13.5)
RBC # BLD AUTO: 4.82 M/UL (ref 4.21–5.77)
WBC OTHER # BLD: 5.8 K/UL (ref 3.5–11.3)

## 2024-01-19 PROCEDURE — 85025 COMPLETE CBC W/AUTO DIFF WBC: CPT

## 2024-01-19 PROCEDURE — G0103 PSA SCREENING: HCPCS

## 2024-01-19 PROCEDURE — 80061 LIPID PANEL: CPT

## 2024-01-19 PROCEDURE — 36415 COLL VENOUS BLD VENIPUNCTURE: CPT

## 2024-01-19 PROCEDURE — G0480 DRUG TEST DEF 1-7 CLASSES: HCPCS

## 2024-01-19 PROCEDURE — 80053 COMPREHEN METABOLIC PANEL: CPT

## 2024-01-19 NOTE — TELEPHONE ENCOUNTER
From: Fredrick Purdyback  To: Lizzeth Hanley  Sent: 1/19/2024 8:03 AM EST  Subject: Cotinine blood test for this morning    Good morning, Lizzeth.  I am getting my lab work done this morning. Might you please add on a COTININE blood test? My wife’s company needs that to demonstrate that I do not smoke (for medical insurance). I am hoping they can add it on to the sample I give this morning.     Thank you.    Sebastian

## 2024-01-20 LAB
ALBUMIN SERPL-MCNC: 4 G/DL (ref 3.5–5.2)
ALBUMIN/GLOB SERPL: 1.7 {RATIO} (ref 1–2.5)
ALP SERPL-CCNC: 68 U/L (ref 40–129)
ALT SERPL-CCNC: 26 U/L (ref 5–41)
ANION GAP SERPL CALCULATED.3IONS-SCNC: 17 MMOL/L (ref 9–17)
AST SERPL-CCNC: 21 U/L
BILIRUB SERPL-MCNC: 0.2 MG/DL (ref 0.3–1.2)
BUN SERPL-MCNC: 18 MG/DL (ref 8–23)
CALCIUM SERPL-MCNC: 9.5 MG/DL (ref 8.6–10.4)
CHLORIDE SERPL-SCNC: 108 MMOL/L (ref 98–107)
CHOLEST SERPL-MCNC: 244 MG/DL
CHOLESTEROL/HDL RATIO: 6.3
CO2 SERPL-SCNC: 19 MMOL/L (ref 20–31)
CREAT SERPL-MCNC: 0.9 MG/DL (ref 0.7–1.2)
GFR SERPL CREATININE-BSD FRML MDRD: >60 ML/MIN/1.73M2
GLUCOSE SERPL-MCNC: 106 MG/DL (ref 70–99)
HDLC SERPL-MCNC: 39 MG/DL
LDLC SERPL CALC-MCNC: 181 MG/DL (ref 0–130)
POTASSIUM SERPL-SCNC: 4.5 MMOL/L (ref 3.7–5.3)
PROT SERPL-MCNC: 6.4 G/DL (ref 6.4–8.3)
PSA SERPL-MCNC: 8.2 NG/ML (ref 0–4)
SODIUM SERPL-SCNC: 144 MMOL/L (ref 135–144)
TRIGL SERPL-MCNC: 119 MG/DL

## 2024-01-22 LAB
COTININE: <5 NG/ML
NICOTINE: <5 NG/ML

## 2024-02-19 SDOH — HEALTH STABILITY: PHYSICAL HEALTH: ON AVERAGE, HOW MANY MINUTES DO YOU ENGAGE IN EXERCISE AT THIS LEVEL?: 40 MIN

## 2024-02-19 SDOH — HEALTH STABILITY: PHYSICAL HEALTH: ON AVERAGE, HOW MANY DAYS PER WEEK DO YOU ENGAGE IN MODERATE TO STRENUOUS EXERCISE (LIKE A BRISK WALK)?: 6 DAYS

## 2024-02-21 DIAGNOSIS — M25.572 LEFT ANKLE PAIN, UNSPECIFIED CHRONICITY: Primary | ICD-10-CM

## 2024-02-22 ENCOUNTER — OFFICE VISIT (OUTPATIENT)
Dept: ORTHOPEDIC SURGERY | Age: 64
End: 2024-02-22
Payer: COMMERCIAL

## 2024-02-22 VITALS — OXYGEN SATURATION: 100 % | HEIGHT: 70 IN | WEIGHT: 217 LBS | BODY MASS INDEX: 31.07 KG/M2 | RESPIRATION RATE: 16 BRPM

## 2024-02-22 DIAGNOSIS — M76.62 ACHILLES TENDINITIS OF LEFT LOWER EXTREMITY: Primary | ICD-10-CM

## 2024-02-22 DIAGNOSIS — F43.9 STRESS: ICD-10-CM

## 2024-02-22 PROCEDURE — 99214 OFFICE O/P EST MOD 30 MIN: CPT | Performed by: ORTHOPAEDIC SURGERY

## 2024-02-22 NOTE — PROGRESS NOTES
Allergies:    Patient has no known allergies.    Family History:  family history includes Cancer in his maternal grandfather.    Social History:   Social History     Occupational History    Not on file   Tobacco Use    Smoking status: Former     Current packs/day: 0.00     Average packs/day: 0.5 packs/day for 28.0 years (14.0 ttl pk-yrs)     Types: Cigarettes     Start date:      Quit date: 2010     Years since quittin.1    Smokeless tobacco: Never   Vaping Use    Vaping Use: Never used   Substance and Sexual Activity    Alcohol use: Yes     Comment: socially    Drug use: No    Sexual activity: Yes     Works full-time as a , mainly seated    OBJECTIVE:  Resp 16   Ht 1.778 m (5' 10\")   Wt 98.4 kg (217 lb)   SpO2 100%   BMI 31.14 kg/m²    Psych: awake, alert  Cardio:  well perfused extremities, no cyanosis  Resp:  normal respiratory effort  Musculoskeletal:    RLE:  Vascular: Limb well perfused, compartments soft/compressible.   Skin: No erythema/ulcers. Intact.   Neurovascular Status:  Grossly neurovascularly intact throughout   Tenderness to Palpation:       LLE:  Vascular: Limb well perfused, compartments soft/compressible.   Skin: No erythema/ulcers. Intact.   Neurovascular Status:  Grossly neurovascularly intact throughout   Tenderness to Palpation: Posterior distal leg at the Achilles tendon   -No significant tenderness at the posterior  --Achilles:    -Moderate thickening of Achilles tendon palpated  -no palpable gap of Achilles tendon noted  -no pain with resisted plantarflexion      RADIOLOGY:   2024 FINDINGS:  Three weightbearing views (AP, Mortise, and Lateral) of the left ankle and three weightbearing views (AP, Oblique, Lateral) of the left foot were obtained in the office today and reviewed, revealing no acute fracture, dislocation, or radioopaque foreign body/tumor. The ankle mortise is maintained with no widening of the clear spaces. Overall alignment is

## 2024-07-12 DIAGNOSIS — N52.9 ERECTILE DYSFUNCTION, UNSPECIFIED ERECTILE DYSFUNCTION TYPE: ICD-10-CM

## 2024-07-12 RX ORDER — TADALAFIL 10 MG/1
10 TABLET ORAL DAILY PRN
Qty: 30 TABLET | Refills: 1 | OUTPATIENT
Start: 2024-07-12

## 2024-07-31 DIAGNOSIS — N52.9 ERECTILE DYSFUNCTION, UNSPECIFIED ERECTILE DYSFUNCTION TYPE: ICD-10-CM

## 2024-07-31 RX ORDER — TADALAFIL 10 MG/1
10 TABLET ORAL DAILY PRN
Qty: 30 TABLET | Refills: 1 | Status: SHIPPED | OUTPATIENT
Start: 2024-07-31

## 2024-08-23 DIAGNOSIS — F43.9 STRESS: ICD-10-CM

## 2024-08-26 SDOH — ECONOMIC STABILITY: FOOD INSECURITY: WITHIN THE PAST 12 MONTHS, THE FOOD YOU BOUGHT JUST DIDN'T LAST AND YOU DIDN'T HAVE MONEY TO GET MORE.: NEVER TRUE

## 2024-08-26 SDOH — ECONOMIC STABILITY: FOOD INSECURITY: WITHIN THE PAST 12 MONTHS, YOU WORRIED THAT YOUR FOOD WOULD RUN OUT BEFORE YOU GOT MONEY TO BUY MORE.: NEVER TRUE

## 2024-08-26 SDOH — ECONOMIC STABILITY: INCOME INSECURITY: HOW HARD IS IT FOR YOU TO PAY FOR THE VERY BASICS LIKE FOOD, HOUSING, MEDICAL CARE, AND HEATING?: NOT HARD AT ALL

## 2024-08-26 ASSESSMENT — PATIENT HEALTH QUESTIONNAIRE - PHQ9
SUM OF ALL RESPONSES TO PHQ QUESTIONS 1-9: 0
SUM OF ALL RESPONSES TO PHQ QUESTIONS 1-9: 0
1. LITTLE INTEREST OR PLEASURE IN DOING THINGS: NOT AT ALL
SUM OF ALL RESPONSES TO PHQ QUESTIONS 1-9: 0
SUM OF ALL RESPONSES TO PHQ QUESTIONS 1-9: 0
SUM OF ALL RESPONSES TO PHQ9 QUESTIONS 1 & 2: 0
2. FEELING DOWN, DEPRESSED OR HOPELESS: NOT AT ALL

## 2024-08-29 ASSESSMENT — PATIENT HEALTH QUESTIONNAIRE - PHQ9
SUM OF ALL RESPONSES TO PHQ9 QUESTIONS 1 & 2: 0
1. LITTLE INTEREST OR PLEASURE IN DOING THINGS: NOT AT ALL
2. FEELING DOWN, DEPRESSED OR HOPELESS: NOT AT ALL

## 2024-09-23 ENCOUNTER — OFFICE VISIT (OUTPATIENT)
Dept: PRIMARY CARE CLINIC | Age: 64
End: 2024-09-23
Payer: COMMERCIAL

## 2024-09-23 VITALS
RESPIRATION RATE: 16 BRPM | DIASTOLIC BLOOD PRESSURE: 80 MMHG | BODY MASS INDEX: 31.05 KG/M2 | HEART RATE: 89 BPM | OXYGEN SATURATION: 99 % | SYSTOLIC BLOOD PRESSURE: 134 MMHG | WEIGHT: 216.4 LBS

## 2024-09-23 DIAGNOSIS — M25.532 WRIST PAIN, CHRONIC, LEFT: ICD-10-CM

## 2024-09-23 DIAGNOSIS — E66.9 OBESITY (BMI 30-39.9): Primary | ICD-10-CM

## 2024-09-23 DIAGNOSIS — G89.29 WRIST PAIN, CHRONIC, LEFT: ICD-10-CM

## 2024-09-23 PROCEDURE — 99214 OFFICE O/P EST MOD 30 MIN: CPT | Performed by: NURSE PRACTITIONER

## 2024-09-23 RX ORDER — SEMAGLUTIDE 0.25 MG/.5ML
0.25 INJECTION, SOLUTION SUBCUTANEOUS
Qty: 2 ML | Refills: 0 | Status: SHIPPED | OUTPATIENT
Start: 2024-09-23

## 2024-09-23 RX ORDER — PREDNISONE 20 MG/1
20 TABLET ORAL DAILY
Qty: 5 TABLET | Refills: 0 | Status: SHIPPED | OUTPATIENT
Start: 2024-09-23 | End: 2024-09-28

## 2024-09-23 NOTE — PROGRESS NOTES
[facility]  [facility] PRIMARY CARE  [address] [place] [address]  [address]  [facility] [address]  Dept: [phone]  Dept Fax: [phone]    Fredrick Pete is a 64 y.o. male who presentstoday for his medical conditions/complaints as noted below.  Fredrick Pete is c/o of  Chief Complaint   Patient presents with    Wrist Pain     Intermittent chronic left wrist pain; weakness. Ongoing multiple years; Worsening         HPI:     Presents with c/o left wrist pain intermittently over several years   No remote or recent injury, pain increase with use   Does have some associated weakness due to pain, occasional numbness   Denies redness, warmth or significant swelling   Discussed carpal tunnel, may consider EMG or imaging, prefers to try steroid and watchful waiting     Interested in trying GLP-1 to assist with weight loss   Stays active and eats healthy diet but is having difficulty losing weight   Education on GLP-1 given   Diet education- limit carbs and sugars, increase protein and fiber         Hemoglobin A1C (%)   Date Value   01/23/2023 5.5   03/06/2018 5.7             ( goal A1C is < 7)   No components found for: \"LABMICR\"  No components found for: \"LDLCHOLESTEROL\", \"LDLCALC\"    (goal LDL is <100)   AST (U/L)   Date Value   01/19/2024 21     ALT (U/L)   Date Value   01/19/2024 26     BUN (mg/dL)   Date Value   01/19/2024 18     BP Readings from Last 3 Encounters:   09/23/24 134/80   11/30/23 136/78   02/23/23 130/78          ([id number])    Past Medical History:   Diagnosis Date    Adenomatous polyp     Arthritis     Diverticulosis     Hemorrhoids     High cholesterol 7/11/2016      Past Surgical History:   Procedure Laterality Date    COLONOSCOPY  04-15-16    ADENOMATOUS POLYP, DIVERTICULOSIS, INTERNAL AND EXTERNAL HEMORRHOIDS     COLONOSCOPY  02/12/2021    COLONOSCOPY N/A 2/12/2021    COLORECTAL CANCER SCREENING, NOT HIGH RISK performed by Solomon Dumont DO at [facility] OR

## 2024-09-24 ENCOUNTER — TELEPHONE (OUTPATIENT)
Dept: PRIMARY CARE CLINIC | Age: 64
End: 2024-09-24

## 2024-09-24 NOTE — TELEPHONE ENCOUNTER
Writer completed PA for Wegovy; PA DENIED for the following reasons:    Your prior authorization request for WEGOVY 0.25 MG/0.5 ML PEN has been denied. The medication you requested is  a non-formulary medication and is not covered by your Plan's pharmacy benefit. Specifically, your Plan's pharmacy  benefit does not cover medications used for the treatment of weight loss.      Writer called patient and notified him, he states that PCP and him discussed compound semaglutide. Please advise if provider agrees to start patient on compound semaglutide

## 2024-10-04 ENCOUNTER — OFFICE VISIT (OUTPATIENT)
Dept: FAMILY MEDICINE CLINIC | Age: 64
End: 2024-10-04
Payer: COMMERCIAL

## 2024-10-04 ENCOUNTER — HOSPITAL ENCOUNTER (OUTPATIENT)
Dept: GENERAL RADIOLOGY | Age: 64
Discharge: HOME | End: 2024-10-06
Payer: COMMERCIAL

## 2024-10-04 ENCOUNTER — HOSPITAL ENCOUNTER (OUTPATIENT)
Age: 64
Discharge: HOME | End: 2024-10-06
Payer: COMMERCIAL

## 2024-10-04 ENCOUNTER — TELEPHONE (OUTPATIENT)
Dept: FAMILY MEDICINE CLINIC | Age: 64
End: 2024-10-04

## 2024-10-04 VITALS
OXYGEN SATURATION: 93 % | HEART RATE: 80 BPM | DIASTOLIC BLOOD PRESSURE: 86 MMHG | SYSTOLIC BLOOD PRESSURE: 120 MMHG | RESPIRATION RATE: 16 BRPM

## 2024-10-04 DIAGNOSIS — M25.422 SWELLING OF LEFT ELBOW: Primary | ICD-10-CM

## 2024-10-04 DIAGNOSIS — M25.422 SWELLING OF LEFT ELBOW: ICD-10-CM

## 2024-10-04 PROCEDURE — 73080 X-RAY EXAM OF ELBOW: CPT

## 2024-10-04 PROCEDURE — 99213 OFFICE O/P EST LOW 20 MIN: CPT | Performed by: NURSE PRACTITIONER

## 2024-10-04 RX ORDER — PREDNISONE 50 MG/1
50 TABLET ORAL DAILY
Qty: 5 TABLET | Refills: 0 | Status: SHIPPED | OUTPATIENT
Start: 2024-10-04 | End: 2024-10-09

## 2024-10-04 RX ORDER — CEPHALEXIN 500 MG/1
500 CAPSULE ORAL 3 TIMES DAILY
Qty: 21 CAPSULE | Refills: 0 | Status: SHIPPED | OUTPATIENT
Start: 2024-10-04 | End: 2024-10-11

## 2024-10-04 NOTE — TELEPHONE ENCOUNTER
Patient called stating that the skin around the elbow is turning red. No increase in pain. Has taken prednisone. X-ray was completed. Please advise.

## 2024-10-05 ASSESSMENT — ENCOUNTER SYMPTOMS
NAUSEA: 0
TROUBLE SWALLOWING: 0
RHINORRHEA: 0
COUGH: 0
SHORTNESS OF BREATH: 0
ABDOMINAL PAIN: 0
VOMITING: 0
SORE THROAT: 0
WHEEZING: 0

## 2024-10-05 NOTE — PROGRESS NOTES
[facility]  [address]  [address]  [facility] [address]    Fredrick Pete is a 64 y.o. male who presents today for his medical conditions/complaints of Elbow Injury (Left, swollen, painful )          HPI:     /86   Pulse 80   Resp 16   SpO2 93%       Joint Pain   Pain location: left elbow. This is a new problem. The current episode started yesterday. There has been no history of extremity trauma. The problem occurs constantly. The problem has been gradually worsening. The quality of the pain is described as aching. The pain is moderate. Associated symptoms include joint swelling. Pertinent negatives include no fever, joint locking, numbness, stiffness or tingling. The symptoms are aggravated by activity. He has tried NSAIDS for the symptoms. The treatment provided no relief. Family history does not include gout.       Past Medical History:   Diagnosis Date    Adenomatous polyp     Arthritis     Diverticulosis     Hemorrhoids     High cholesterol 2016        Past Surgical History:   Procedure Laterality Date    COLONOSCOPY  04-15-16    ADENOMATOUS POLYP, DIVERTICULOSIS, INTERNAL AND EXTERNAL HEMORRHOIDS     COLONOSCOPY  2021    COLONOSCOPY N/A 2021    COLORECTAL CANCER SCREENING, NOT HIGH RISK performed by Solomon Dumont DO at [facility] OR    FINGER TRIGGER RELEASE Right     KNEE SURGERY Right     NECK SURGERY N/A 2020    EXCISION SUBCUTANEOUS LESION NECK performed by Jewel Alexander MD at [facility] OR    OTHER SURGICAL HISTORY  2020    EXCISION SUBCUTANEOUS LESION NECK (    VASECTOMY         Family History   Problem Relation Age of Onset    Cancer Maternal Grandfather        Social History     Tobacco Use    Smoking status: Former     Current packs/day: 0.00     Average packs/day: 0.5 packs/day for 28.0 years (14.0 ttl pk-yrs)     Types: Cigarettes     Start date:      Quit date: 2010     Years since quittin.7    Smokeless tobacco:

## 2024-10-28 ENCOUNTER — OFFICE VISIT (OUTPATIENT)
Dept: PRIMARY CARE CLINIC | Age: 64
End: 2024-10-28
Payer: COMMERCIAL

## 2024-10-28 VITALS
SYSTOLIC BLOOD PRESSURE: 128 MMHG | HEART RATE: 76 BPM | BODY MASS INDEX: 30.56 KG/M2 | DIASTOLIC BLOOD PRESSURE: 78 MMHG | WEIGHT: 213 LBS | RESPIRATION RATE: 16 BRPM | OXYGEN SATURATION: 97 %

## 2024-10-28 DIAGNOSIS — M25.522 LEFT ELBOW PAIN: ICD-10-CM

## 2024-10-28 DIAGNOSIS — E66.9 OBESITY (BMI 30-39.9): Primary | ICD-10-CM

## 2024-10-28 PROCEDURE — 99214 OFFICE O/P EST MOD 30 MIN: CPT | Performed by: NURSE PRACTITIONER

## 2024-10-28 ASSESSMENT — ENCOUNTER SYMPTOMS
ABDOMINAL PAIN: 0
COLOR CHANGE: 0
VOMITING: 0
NAUSEA: 0
SHORTNESS OF BREATH: 0
DIARRHEA: 0
CHEST TIGHTNESS: 0
RHINORRHEA: 0
SORE THROAT: 0

## 2024-10-28 NOTE — PROGRESS NOTES
[facility]  [facility] PRIMARY CARE  [address] [place] [address] [address]  [address]  [facility] [address]  Dept: [phone]  Dept Fax: [phone]    Fredrick Pete is a 64 y.o. male who presentstoday for his medical conditions/complaints as noted below.  Fredrick Pete is c/o of  Chief Complaint   Patient presents with    Weight Management     Wegovy      Elbow Pain     Left Elbow pain; saw walk in clinic 10/4/24 and completed xray         HPI:     Presents for 1 month recheck after initiating compounded semaglutide from [facility]  Has been taking for 2 weeks, has slight nausea and indigestion but managing with otc antacid   No other associated side effects, would like to continue   Is down3 lbs since starting medication    Had acute left elbow pain, seen in walkin   XR normal, no injury or trauma   Did have improvement with steroid course   Pain remains but swelling is improved   No repetitive movements or strain leading up to episode of swelling and pain         Hemoglobin A1C (%)   Date Value   01/23/2023 5.5   03/06/2018 5.7             ( goal A1C is < 7)   No components found for: \"LABMICR\"  No components found for: \"LDLCHOLESTEROL\", \"LDLCALC\"    (goal LDL is <100)   AST (U/L)   Date Value   01/19/2024 21     ALT (U/L)   Date Value   01/19/2024 26     BUN (mg/dL)   Date Value   01/19/2024 18     BP Readings from Last 3 Encounters:   10/28/24 128/78   10/04/24 120/86   09/23/24 134/80          ([id number])    Past Medical History:   Diagnosis Date    Adenomatous polyp     Arthritis     Diverticulosis     Hemorrhoids     High cholesterol 7/11/2016      Past Surgical History:   Procedure Laterality Date    COLONOSCOPY  04-15-16    ADENOMATOUS POLYP, DIVERTICULOSIS, INTERNAL AND EXTERNAL HEMORRHOIDS     COLONOSCOPY  02/12/2021    COLONOSCOPY N/A 2/12/2021    COLORECTAL CANCER SCREENING, NOT HIGH RISK performed by Solomon Dumont DO at [facility] OR    FINGER TRIGGER RELEASE Right     KNEE

## 2024-12-05 DIAGNOSIS — E66.9 OBESITY (BMI 30-39.9): ICD-10-CM

## 2024-12-05 RX ORDER — SEMAGLUTIDE 0.25 MG/.5ML
0.25 INJECTION, SOLUTION SUBCUTANEOUS
Qty: 2 ML | Refills: 0 | OUTPATIENT
Start: 2024-12-05

## 2024-12-07 DIAGNOSIS — E66.9 OBESITY (BMI 30-39.9): ICD-10-CM

## 2024-12-08 RX ORDER — SEMAGLUTIDE 0.25 MG/.5ML
0.25 INJECTION, SOLUTION SUBCUTANEOUS
Qty: 2 ML | Refills: 0 | OUTPATIENT
Start: 2024-12-08

## 2024-12-14 ENCOUNTER — PATIENT MESSAGE (OUTPATIENT)
Dept: PRIMARY CARE CLINIC | Age: 64
End: 2024-12-14

## 2024-12-14 DIAGNOSIS — T50.905A DRUG-INDUCED NAUSEA AND VOMITING: Primary | ICD-10-CM

## 2024-12-14 DIAGNOSIS — R11.2 DRUG-INDUCED NAUSEA AND VOMITING: Primary | ICD-10-CM

## 2024-12-14 RX ORDER — ONDANSETRON 4 MG/1
4 TABLET, FILM COATED ORAL DAILY PRN
Qty: 30 TABLET | Refills: 0 | Status: SHIPPED | OUTPATIENT
Start: 2024-12-14

## 2025-01-03 DIAGNOSIS — E66.9 OBESITY (BMI 30-39.9): ICD-10-CM

## 2025-01-03 RX ORDER — SEMAGLUTIDE 0.25 MG/.5ML
0.25 INJECTION, SOLUTION SUBCUTANEOUS
Qty: 2 ML | Refills: 0 | OUTPATIENT
Start: 2025-01-03

## 2025-01-27 SDOH — ECONOMIC STABILITY: FOOD INSECURITY: WITHIN THE PAST 12 MONTHS, YOU WORRIED THAT YOUR FOOD WOULD RUN OUT BEFORE YOU GOT MONEY TO BUY MORE.: NEVER TRUE

## 2025-01-27 SDOH — ECONOMIC STABILITY: FOOD INSECURITY: WITHIN THE PAST 12 MONTHS, THE FOOD YOU BOUGHT JUST DIDN'T LAST AND YOU DIDN'T HAVE MONEY TO GET MORE.: NEVER TRUE

## 2025-01-27 SDOH — ECONOMIC STABILITY: INCOME INSECURITY: IN THE LAST 12 MONTHS, WAS THERE A TIME WHEN YOU WERE NOT ABLE TO PAY THE MORTGAGE OR RENT ON TIME?: NO

## 2025-01-27 SDOH — ECONOMIC STABILITY: TRANSPORTATION INSECURITY
IN THE PAST 12 MONTHS, HAS THE LACK OF TRANSPORTATION KEPT YOU FROM MEDICAL APPOINTMENTS OR FROM GETTING MEDICATIONS?: NO

## 2025-01-27 ASSESSMENT — PATIENT HEALTH QUESTIONNAIRE - PHQ9
SUM OF ALL RESPONSES TO PHQ9 QUESTIONS 1 & 2: 0
2. FEELING DOWN, DEPRESSED OR HOPELESS: NOT AT ALL
SUM OF ALL RESPONSES TO PHQ QUESTIONS 1-9: 0
2. FEELING DOWN, DEPRESSED OR HOPELESS: NOT AT ALL
SUM OF ALL RESPONSES TO PHQ QUESTIONS 1-9: 0
SUM OF ALL RESPONSES TO PHQ9 QUESTIONS 1 & 2: 0
1. LITTLE INTEREST OR PLEASURE IN DOING THINGS: NOT AT ALL
1. LITTLE INTEREST OR PLEASURE IN DOING THINGS: NOT AT ALL
SUM OF ALL RESPONSES TO PHQ QUESTIONS 1-9: 0
SUM OF ALL RESPONSES TO PHQ QUESTIONS 1-9: 0

## 2025-01-29 ENCOUNTER — OFFICE VISIT (OUTPATIENT)
Dept: PRIMARY CARE CLINIC | Age: 65
End: 2025-01-29
Payer: COMMERCIAL

## 2025-01-29 VITALS
DIASTOLIC BLOOD PRESSURE: 84 MMHG | SYSTOLIC BLOOD PRESSURE: 122 MMHG | WEIGHT: 205 LBS | OXYGEN SATURATION: 98 % | RESPIRATION RATE: 15 BRPM | HEART RATE: 73 BPM | BODY MASS INDEX: 29.41 KG/M2

## 2025-01-29 DIAGNOSIS — R06.81 WITNESSED EPISODE OF APNEA: ICD-10-CM

## 2025-01-29 DIAGNOSIS — E66.9 OBESITY (BMI 30-39.9): Primary | ICD-10-CM

## 2025-01-29 PROCEDURE — 99214 OFFICE O/P EST MOD 30 MIN: CPT | Performed by: NURSE PRACTITIONER

## 2025-01-29 ASSESSMENT — ENCOUNTER SYMPTOMS
RHINORRHEA: 0
SHORTNESS OF BREATH: 0
ABDOMINAL PAIN: 0
NAUSEA: 0
DIARRHEA: 0
CHEST TIGHTNESS: 0
VOMITING: 0
SORE THROAT: 0
COLOR CHANGE: 0

## 2025-01-29 NOTE — PROGRESS NOTES
[facility]  [facility] PRIMARY CARE  [address] [place] [address] [address]  [address]  [facility] [address]  Dept: [phone]  Dept Fax: [phone]    Fredrick Pete is a 64 y.o. male who presentstoday for his medical conditions/complaints as noted below.  Fredrick Pete is c/o of  Chief Complaint   Patient presents with    Weight Management     Compound Semaglutide-[facility]         HPI:     History of Present Illness  The patient presents for weight management. Is doing well on compound semaglutide from [facility], down 12 lbs since last OV. Has slight nausea but not unmanageable. He has found some relief through the consumption of protein shakes. He is considering an increase in his semaglutide dosage from 1.8 to 2.2 but expresses concern about potential exacerbation of his nausea. He also inquires about the possibility of reducing the dosage after a trial period at the higher dose.    He reports no other health issues apart from the usual aches and pains associated with aging, which are effectively managed with over-the-counter medications such as Tylenol and ibuprofen, and regular exercise.    He has been informed by his partner of suspected sleep apnea, characterized by episodes of gasping for air during sleep. However, he does not believe he has this condition. He reports feeling well-rested upon waking, provided he has had uninterrupted sleep. He admits to taking afternoon naps and occasionally waking up with headaches. His blood pressure readings have been within normal limits.    He has observed that his heart rate increases to the 140s during exercise, particularly during high-intensity workouts such as P90X3, which last approximately 30 minutes. He reports no chest pain or SOB.      Hemoglobin A1C (%)   Date Value   01/23/2023 5.5   03/06/2018 5.7             ( goal A1C is < 7)   No components found for: \"LABMICR\"  No components found for: \"LDLCHOLESTEROL\", \"LDLCALC\"    (goal LDL is <100)

## 2025-02-13 DIAGNOSIS — R11.2 DRUG-INDUCED NAUSEA AND VOMITING: ICD-10-CM

## 2025-02-13 DIAGNOSIS — T50.905A DRUG-INDUCED NAUSEA AND VOMITING: ICD-10-CM

## 2025-02-13 RX ORDER — ONDANSETRON 4 MG/1
4 TABLET, FILM COATED ORAL DAILY PRN
Qty: 30 TABLET | Refills: 0 | Status: SHIPPED | OUTPATIENT
Start: 2025-02-13

## 2025-02-15 ENCOUNTER — HOSPITAL ENCOUNTER (EMERGENCY)
Facility: CLINIC | Age: 65
Discharge: HOME | End: 2025-02-15
Attending: STUDENT IN AN ORGANIZED HEALTH CARE EDUCATION/TRAINING PROGRAM
Payer: COMMERCIAL

## 2025-02-15 ENCOUNTER — APPOINTMENT (OUTPATIENT)
Dept: GENERAL RADIOLOGY | Facility: CLINIC | Age: 65
End: 2025-02-15
Payer: COMMERCIAL

## 2025-02-15 VITALS
OXYGEN SATURATION: 96 % | HEIGHT: 70 IN | HEART RATE: 89 BPM | RESPIRATION RATE: 18 BRPM | WEIGHT: 195 LBS | DIASTOLIC BLOOD PRESSURE: 85 MMHG | SYSTOLIC BLOOD PRESSURE: 156 MMHG | BODY MASS INDEX: 27.92 KG/M2 | TEMPERATURE: 98.5 F

## 2025-02-15 DIAGNOSIS — S86.912A KNEE STRAIN, LEFT, INITIAL ENCOUNTER: Primary | ICD-10-CM

## 2025-02-15 PROCEDURE — 99284 EMERGENCY DEPT VISIT MOD MDM: CPT

## 2025-02-15 PROCEDURE — 6370000000 HC RX 637 (ALT 250 FOR IP): Performed by: STUDENT IN AN ORGANIZED HEALTH CARE EDUCATION/TRAINING PROGRAM

## 2025-02-15 PROCEDURE — 73562 X-RAY EXAM OF KNEE 3: CPT

## 2025-02-15 PROCEDURE — 6360000002 HC RX W HCPCS: Performed by: STUDENT IN AN ORGANIZED HEALTH CARE EDUCATION/TRAINING PROGRAM

## 2025-02-15 PROCEDURE — 96372 THER/PROPH/DIAG INJ SC/IM: CPT

## 2025-02-15 RX ORDER — CYCLOBENZAPRINE HCL 10 MG
10 TABLET ORAL 3 TIMES DAILY PRN
Qty: 30 TABLET | Refills: 0 | Status: SHIPPED | OUTPATIENT
Start: 2025-02-15 | End: 2025-02-25

## 2025-02-15 RX ORDER — ORPHENADRINE CITRATE 30 MG/ML
60 INJECTION INTRAMUSCULAR; INTRAVENOUS ONCE
Status: COMPLETED | OUTPATIENT
Start: 2025-02-15 | End: 2025-02-15

## 2025-02-15 RX ORDER — ACETAMINOPHEN 500 MG
1000 TABLET ORAL ONCE
Status: COMPLETED | OUTPATIENT
Start: 2025-02-15 | End: 2025-02-15

## 2025-02-15 RX ADMIN — ACETAMINOPHEN 1000 MG: 500 TABLET ORAL at 11:37

## 2025-02-15 RX ADMIN — ORPHENADRINE CITRATE 60 MG: 60 INJECTION INTRAMUSCULAR; INTRAVENOUS at 11:38

## 2025-02-15 ASSESSMENT — PAIN DESCRIPTION - ORIENTATION: ORIENTATION: LEFT

## 2025-02-15 ASSESSMENT — PAIN SCALES - GENERAL: PAINLEVEL_OUTOF10: 7

## 2025-02-15 ASSESSMENT — PAIN - FUNCTIONAL ASSESSMENT: PAIN_FUNCTIONAL_ASSESSMENT: 0-10

## 2025-02-15 ASSESSMENT — PAIN DESCRIPTION - PAIN TYPE: TYPE: ACUTE PAIN

## 2025-02-15 ASSESSMENT — PAIN DESCRIPTION - LOCATION: LOCATION: KNEE

## 2025-02-15 ASSESSMENT — PAIN DESCRIPTION - ONSET: ONSET: ON-GOING

## 2025-02-15 ASSESSMENT — PAIN DESCRIPTION - FREQUENCY: FREQUENCY: CONTINUOUS

## 2025-02-15 ASSESSMENT — PAIN DESCRIPTION - DESCRIPTORS: DESCRIPTORS: PATIENT UNABLE TO DESCRIBE

## 2025-02-15 NOTE — ED PROVIDER NOTES
[facility]  [address] [facility] [address]  Phone: [phone]        [facility] EMERGENCY DEPARTMENT  EMERGENCY DEPARTMENT ENCOUNTER      Pt Name: Fredrick Pete  MRN: [id number]  Birthdate 1960  Date of evaluation: 2/15/2025  Provider: Lo Frias DO    CHIEF COMPLAINT       Chief Complaint   Patient presents with    Knee Injury     Twisted last night  was hurting 2 weeks ago       HISTORY OF PRESENT ILLNESS   (Location/Symptom, Timing/Onset,Context/Setting, Quality, Duration, Modifying Factors, Severity)  Note limiting factors.     Fredrick Pete is a 64 y.o. male who presents to the emergency department with left knee pain.  Patient states about 2 weeks ago he was riding in a car, states he was sitting in the backseat for a long period of time and his knee started hurting him at that point.  Patient states he does have some known previous injuries to his left knee, states he was supposed to have a surgery on a few years ago however opted not to.  Patient states last night he somehow twisted his knee, did not fall to the ground and has been having significant pain here since.  Patient states he is having difficulty bearing weight however was able to walk into the emergency department.  Patient took 600 mg of Motrin this morning with some improvement in his symptoms.  Patient did have a previous surgery on his right knee.  No fevers or chills.    Nursing Notes were reviewed.    REVIEW OF SYSTEMS       Review of Systems   Constitutional:  Negative for chills and fever.   Musculoskeletal:  Positive for arthralgias, gait problem and joint swelling.   Skin:  Negative for rash and wound.       PAST MEDICAL HISTORY     Past Medical History:   Diagnosis Date    Adenomatous polyp     Arthritis     Diverticulosis     Hemorrhoids     High cholesterol 7/11/2016       SURGICAL HISTORY       Past Surgical History:   Procedure Laterality Date    COLONOSCOPY  04-15-16     Health     Financial Resource Strain: Low Risk  (8/26/2024)    Overall Financial Resource Strain (CARDIA)     Difficulty of Paying Living Expenses: Not hard at all   Food Insecurity: No Food Insecurity (1/27/2025)    Hunger Vital Sign     Worried About Running Out of Food in the Last Year: Never true     Ran Out of Food in the Last Year: Never true   Transportation Needs: No Transportation Needs (1/27/2025)    PRAPARE - Transportation     Lack of Transportation (Medical): No     Lack of Transportation (Non-Medical): No   Physical Activity: Sufficiently Active (2/19/2024)    Exercise Vital Sign     Days of Exercise per Week: 6 days     Minutes of Exercise per Session: 40 min   Recent Concern: Physical Activity - Insufficiently Active (1/1/2024)    Exercise Vital Sign     Days of Exercise per Week: 2 days     Minutes of Exercise per Session: 40 min   Housing Stability: Low Risk  (1/27/2025)    Housing Stability Vital Sign     Unable to Pay for Housing in the Last Year: No     Number of Times Moved in the Last Year: 0     Homeless in the Last Year: No       SCREENINGS      Cushing Coma Scale  Eye Opening: Spontaneous  Best Verbal Response: Oriented  Best Motor Response: Obeys commands  Cushing Coma Scale Score: 15      PHYSICAL EXAM       Vitals:    02/15/25 1121   BP: (!) 156/85   Pulse: 89   Resp: 18   Temp: 98.5 °F (36.9 °C)   TempSrc: Oral   SpO2: 96%   Weight: 88.5 kg (195 lb)   Height: 1.778 m (5' 10\")       Physical Exam  Vitals reviewed.   Constitutional:       General: He is not in acute distress.     Appearance: Normal appearance.   HENT:      Head: Normocephalic and atraumatic.   Eyes:      General:         Right eye: No discharge.         Left eye: No discharge.   Cardiovascular:      Rate and Rhythm: Normal rate and regular rhythm.      Pulses: Normal pulses.      Comments: Bilateral DPs 2+  Pulmonary:      Effort: Pulmonary effort is normal. No respiratory distress.   Musculoskeletal:      Comments:

## 2025-02-15 NOTE — DISCHARGE INSTRUCTIONS
We evaluated you for your knee pain.  Your x-ray was unremarkable.  You may have an injury to your meniscus or some of the ligaments in your knee.  Wear the Ace wrap.  Use the muscle relaxers.  Use Tylenol and Motrin as needed as well.    Go to the orthopedic appointment on Monday for further evaluation.    Follow closely with your primary doctor.    Return to the emergency department if you develop any worsening or concerning symptoms.

## 2025-02-17 ENCOUNTER — OFFICE VISIT (OUTPATIENT)
Dept: ORTHOPEDIC SURGERY | Age: 65
End: 2025-02-17
Payer: COMMERCIAL

## 2025-02-17 VITALS — OXYGEN SATURATION: 98 % | RESPIRATION RATE: 17 BRPM | WEIGHT: 195 LBS | HEIGHT: 70 IN | BODY MASS INDEX: 27.92 KG/M2

## 2025-02-17 DIAGNOSIS — M22.42 CHONDROMALACIA, PATELLA, LEFT: ICD-10-CM

## 2025-02-17 DIAGNOSIS — M25.562 LEFT KNEE PAIN, UNSPECIFIED CHRONICITY: Primary | ICD-10-CM

## 2025-02-17 DIAGNOSIS — M23.92 INTERNAL DERANGEMENT OF KNEE, LEFT: Primary | ICD-10-CM

## 2025-02-17 PROCEDURE — 20611 DRAIN/INJ JOINT/BURSA W/US: CPT | Performed by: PHYSICIAN ASSISTANT

## 2025-02-17 PROCEDURE — 99204 OFFICE O/P NEW MOD 45 MIN: CPT | Performed by: PHYSICIAN ASSISTANT

## 2025-02-17 RX ORDER — METHYLPREDNISOLONE ACETATE 80 MG/ML
80 INJECTION, SUSPENSION INTRA-ARTICULAR; INTRALESIONAL; INTRAMUSCULAR; SOFT TISSUE ONCE
Status: COMPLETED | OUTPATIENT
Start: 2025-02-17 | End: 2025-02-17

## 2025-02-17 RX ORDER — LIDOCAINE HYDROCHLORIDE 10 MG/ML
2 INJECTION, SOLUTION INFILTRATION; PERINEURAL ONCE
Status: COMPLETED | OUTPATIENT
Start: 2025-02-17 | End: 2025-02-17

## 2025-02-17 RX ADMIN — METHYLPREDNISOLONE ACETATE 80 MG: 80 INJECTION, SUSPENSION INTRA-ARTICULAR; INTRALESIONAL; INTRAMUSCULAR; SOFT TISSUE at 16:10

## 2025-02-17 RX ADMIN — LIDOCAINE HYDROCHLORIDE 2 ML: 10 INJECTION, SOLUTION INFILTRATION; PERINEURAL at 16:10

## 2025-02-17 ASSESSMENT — ENCOUNTER SYMPTOMS
COLOR CHANGE: 0
ABDOMINAL PAIN: 0
APNEA: 0
ABDOMINAL DISTENTION: 0
NAUSEA: 0
RESPIRATORY NEGATIVE: 1
VOMITING: 0
DIARRHEA: 0
CHEST TIGHTNESS: 0
GASTROINTESTINAL NEGATIVE: 1
SHORTNESS OF BREATH: 0
CONSTIPATION: 0
COUGH: 0

## 2025-02-17 NOTE — PROGRESS NOTES
History:   Procedure Laterality Date    COLONOSCOPY  04-15-16    ADENOMATOUS POLYP, DIVERTICULOSIS, INTERNAL AND EXTERNAL HEMORRHOIDS     COLONOSCOPY  02/12/2021    COLONOSCOPY N/A 2/12/2021    COLORECTAL CANCER SCREENING, NOT HIGH RISK performed by Solomon Dumont DO at [facility] OR    FINGER TRIGGER RELEASE Right     KNEE SURGERY Right     NECK SURGERY N/A 1/31/2020    EXCISION SUBCUTANEOUS LESION NECK performed by Jewel Alexander MD at [facility] OR    OTHER SURGICAL HISTORY  01/31/2020    EXCISION SUBCUTANEOUS LESION NECK (    VASECTOMY         Current Medications:   Current Outpatient Medications   Medication Sig Dispense Refill    cyclobenzaprine (FLEXERIL) 10 MG tablet Take 1 tablet by mouth 3 times daily as needed for Muscle spasms 30 tablet 0    ondansetron (ZOFRAN) 4 MG tablet Take 1 tablet by mouth daily as needed for Nausea or Vomiting 30 tablet 0    SEMAGLUTIDE-WEIGHT MANAGEMENT SC Inject into the skin COMPOUND - BUDERER      sertraline (ZOLOFT) 50 MG tablet TAKE ONE TABLET BY MOUTH ONCE A DAY 90 tablet 1    tadalafil (CIALIS) 10 MG tablet Take 1 tablet by mouth daily as needed for Erectile Dysfunction 30 tablet 1    diclofenac sodium (VOLTAREN) 1 % GEL Apply 4 g topically 4 times daily as needed for Pain 200 g 0    sodium chloride (OCEAN) 0.65 % nasal spray 1 spray by Nasal route as needed for Congestion 30 mL 1    fluticasone (FLONASE) 50 MCG/ACT nasal spray 2 sprays by Each Nostril route daily 48 g 1    Multiple Vitamin (MULTI-VITAMINS PO) Take by mouth       Current Facility-Administered Medications   Medication Dose Route Frequency Provider Last Rate Last Admin    methylPREDNISolone acetate (DEPO-MEDROL) injection 80 mg  80 mg Intra-artICUlar Once         lidocaine 1 % injection 2 mL  2 mL Intra-artICUlar Once            Allergies:    Patient has no known allergies.    Social History:   Social History     Socioeconomic History    Marital status:      Spouse name: Not on file    Number of

## 2025-02-17 NOTE — PATIENT INSTRUCTIONS
CORTISONE INJECTION CARE    The injection site should never get red, hot, or swollen and if it does the patient will contact our office right away. The patient may experience a increase in soreness the first 24-48 hours due to a cortisone flair and can take anti-inflammatories for a short period of time to reduce that soreness. The patient should not submerge the injection site in water for a minimum of 24 hours to avoid infection. This means no lakes, pools, ponds, or hot tubs for 24 hours. If the patient is diabetic the injection may increase their blood sugar for up to one week. The patient can do this cortisone injection once every 4 months as needed.                                                                                                                                                                                                                                                                                                                PATIENTIQ:  PatientIQ helps [facility] stay in touch with you to know how you're feeling, and provides education and care instructions to you at various time points.   Your answers help your care team track your progress to provide the best care possible. PatientIQ will contact you pre-op and post-op via email or text with:  Educational Videos and Care Instructions  Questionnaires About How You're Feeling    Your participation provides you valuable education and helps [facility] continue to provide quality care to all patients. Thank you

## 2025-02-27 DIAGNOSIS — F43.9 STRESS: ICD-10-CM

## 2025-02-27 DIAGNOSIS — N52.9 ERECTILE DYSFUNCTION, UNSPECIFIED ERECTILE DYSFUNCTION TYPE: ICD-10-CM

## 2025-02-27 RX ORDER — TADALAFIL 10 MG/1
10 TABLET ORAL DAILY PRN
Qty: 30 TABLET | Refills: 3 | Status: SHIPPED | OUTPATIENT
Start: 2025-02-27

## 2025-03-26 ENCOUNTER — HOSPITAL ENCOUNTER (OUTPATIENT)
Dept: PHYSICAL THERAPY | Facility: CLINIC | Age: 65
Setting detail: THERAPIES SERIES
Discharge: HOME | End: 2025-03-26
Payer: COMMERCIAL

## 2025-03-26 PROCEDURE — 97161 PT EVAL LOW COMPLEX 20 MIN: CPT

## 2025-03-26 PROCEDURE — 97110 THERAPEUTIC EXERCISES: CPT

## 2025-03-26 NOTE — CONSULTS
[] [facility]  Outpatient Rehabilitation &  Therapy  [address]  [phone]  F: [phone] [] [facility]  Outpatient Rehabilitation &  Therapy  [address]   [address]  P: [phone]  F: [phone] [] [facility]  Outpatient Rehabilitation &  Therapy  [address]  P: [phone]  F: [phone] [] [facility]  Outpatient Rehabilitation &  Therapy  [address]  P: [phone]  F: [phone] [x] [facility]  Outpatient Rehabilitation &  Therapy  [address]   P: [phone]  F: [phone]      Physical Therapy Lower Extremity Evaluation    Date:  3/26/2025  Patient: Fredrick Pete   : 1960  MRN: [id number]  Physician: BENJY House Insurance: [email or web address] Medicare (60/60 vs)  Medical Diagnosis: Internal derangement of knee, left (M23.92)     Rehab Codes: M62.81 , M25.562, M25.662  Onset date: 25      Next 's appt.: 25    Subjective:   CC/HPI: Patient is a 65 y/o male presenting with left knee pain that started 25 after he twisted his leg. He had difficulty weight bearing for a few days. Went to Urgent Care and x-ray was given as well as referral to orthopedics.   Cortisone injection completed at ortho.   Hx of bilateral knee pain. He has had an overall reduction of pain since onset. Has hx of right meniscus surgery. He has hx of a left achilles injury about 1 year ago, resolved a few months ago.He does add getting some left sided low back pain with prolonged driving.       PMHx: [] Unremarkable [] Diabetes [] HTN  [] Pacemaker   [] MI/Heart Problems [] Cancer [] Arthritis [] Other:              [x] Refer to full medical chart  In EPIC     Past Medical History         Diagnosis Date Comments     Adenomatous polyp [D36.9]       Hemorrhoids [K64.9]       Diverticulosis [K57.90]       High cholesterol [E78.00] 2016      Arthritis [M19.90]

## 2025-03-31 NOTE — PROGRESS NOTES
issues.      Review of Systems   Constitutional:  Positive for activity change. Negative for appetite change, fatigue and fever.   Respiratory: Negative.  Negative for apnea, cough, chest tightness and shortness of breath.    Cardiovascular: Negative.  Negative for chest pain, palpitations and leg swelling.   Gastrointestinal: Negative.  Negative for abdominal distention, abdominal pain, constipation, diarrhea, nausea and vomiting.   Genitourinary: Negative.  Negative for difficulty urinating, dysuria and hematuria.   Musculoskeletal: Negative.  Negative for arthralgias, gait problem, joint swelling and myalgias.   Skin: Negative.  Negative for color change and rash.   Neurological: Negative.  Negative for dizziness, weakness, numbness and headaches.   Psychiatric/Behavioral: Negative.  Negative for sleep disturbance.      Objective :   Resp 15   Ht 1.778 m (5' 10\")   Wt 89.4 kg (197 lb)   SpO2 97%   BMI 28.27 kg/m²  Body mass index is 28.27 kg/m².  General: Fredrick Pete is a 64 y.o. male who is alert and oriented and sitting comfortably in our office.      Physical Exam  Gen: alert and oriented  Psych:  Appropriate affect; Appropriate knowledge base; Appropriate mood  Head: normocephalic, atraumatic   Chest: symmetric chest excursion  Pelvis: stable with ambulation  Skin:  Intact without rashes, lesions, or ulcerations.  Nails are not dystrophic.    Neurologic:  Sensation intact to light touch to sural/saphenous/SPN/DPN/plantar nerves. Motor intact to EHL/FHL/TA/GS.   Vascular:  Capillary refill is less than three seconds. Distal pulses are palpable.  There is no lymphadenopathy.    Physical Exam  Musculoskeletal:  Left Knee:  - Patellar crepitation with knee extension.  - No effusion.  - Great quadriceps tone.  - 0 degrees of extension and 137 degrees of flexion.  - Very mild medial joint line pain.  - Patella can be moved one quadrant laterally and two quadrants medially.  - Pain with patellar

## 2025-04-01 ENCOUNTER — OFFICE VISIT (OUTPATIENT)
Dept: ORTHOPEDIC SURGERY | Age: 65
End: 2025-04-01
Payer: COMMERCIAL

## 2025-04-01 ENCOUNTER — HOSPITAL ENCOUNTER (OUTPATIENT)
Dept: PHYSICAL THERAPY | Facility: CLINIC | Age: 65
Setting detail: THERAPIES SERIES
Discharge: HOME | End: 2025-04-01

## 2025-04-01 VITALS — HEIGHT: 70 IN | BODY MASS INDEX: 28.2 KG/M2 | WEIGHT: 197 LBS | RESPIRATION RATE: 15 BRPM | OXYGEN SATURATION: 97 %

## 2025-04-01 DIAGNOSIS — M22.42 CHONDROMALACIA, PATELLA, LEFT: Primary | ICD-10-CM

## 2025-04-01 DIAGNOSIS — M23.92 INTERNAL DERANGEMENT OF KNEE, LEFT: ICD-10-CM

## 2025-04-01 PROCEDURE — 99213 OFFICE O/P EST LOW 20 MIN: CPT | Performed by: PHYSICIAN ASSISTANT

## 2025-04-01 ASSESSMENT — ENCOUNTER SYMPTOMS
ABDOMINAL PAIN: 0
COLOR CHANGE: 0
APNEA: 0
VOMITING: 0
CONSTIPATION: 0
GASTROINTESTINAL NEGATIVE: 1
CHEST TIGHTNESS: 0
DIARRHEA: 0
ABDOMINAL DISTENTION: 0
NAUSEA: 0
RESPIRATORY NEGATIVE: 1
COUGH: 0
SHORTNESS OF BREATH: 0

## 2025-04-01 NOTE — PATIENT INSTRUCTIONS
PATIENTIQ:  PatientIQ helps [facility] stay in touch with you to know how you're feeling, and provides education and care instructions to you at various time points.   Your answers help your care team track your progress to provide the best care possible. PatientIQ will contact you pre-op and post-op via email or text with:  Educational Videos and Care Instructions  Questionnaires About How You're Feeling    Your participation provides you valuable education and helps [facility] continue to provide quality care to all patients. Thank you

## 2025-04-04 DIAGNOSIS — T50.905A DRUG-INDUCED NAUSEA AND VOMITING: ICD-10-CM

## 2025-04-04 DIAGNOSIS — R11.2 DRUG-INDUCED NAUSEA AND VOMITING: ICD-10-CM

## 2025-04-04 RX ORDER — ONDANSETRON 4 MG/1
4 TABLET, FILM COATED ORAL DAILY PRN
Qty: 30 TABLET | Refills: 0 | Status: SHIPPED | OUTPATIENT
Start: 2025-04-04

## 2025-04-25 ENCOUNTER — PATIENT MESSAGE (OUTPATIENT)
Dept: PRIMARY CARE CLINIC | Age: 65
End: 2025-04-25

## 2025-04-25 DIAGNOSIS — L72.9 INFECTED CYST OF SKIN: Primary | ICD-10-CM

## 2025-04-25 DIAGNOSIS — L08.9 INFECTED CYST OF SKIN: Primary | ICD-10-CM

## 2025-04-25 RX ORDER — SULFAMETHOXAZOLE AND TRIMETHOPRIM 800; 160 MG/1; MG/1
1 TABLET ORAL 2 TIMES DAILY
Qty: 14 TABLET | Refills: 0 | Status: SHIPPED | OUTPATIENT
Start: 2025-04-25 | End: 2025-05-02

## 2025-04-30 ENCOUNTER — OFFICE VISIT (OUTPATIENT)
Dept: PRIMARY CARE CLINIC | Age: 65
End: 2025-04-30
Payer: COMMERCIAL

## 2025-04-30 VITALS
OXYGEN SATURATION: 97 % | BODY MASS INDEX: 27.72 KG/M2 | RESPIRATION RATE: 15 BRPM | SYSTOLIC BLOOD PRESSURE: 118 MMHG | DIASTOLIC BLOOD PRESSURE: 80 MMHG | WEIGHT: 193.2 LBS | HEART RATE: 78 BPM

## 2025-04-30 DIAGNOSIS — L72.9 INFECTED CYST OF SKIN: ICD-10-CM

## 2025-04-30 DIAGNOSIS — Z76.89 ENCOUNTER FOR WEIGHT MANAGEMENT: Primary | ICD-10-CM

## 2025-04-30 DIAGNOSIS — R73.09 ELEVATED GLUCOSE LEVEL: ICD-10-CM

## 2025-04-30 DIAGNOSIS — L08.9 INFECTED CYST OF SKIN: ICD-10-CM

## 2025-04-30 LAB — HBA1C MFR BLD: 5.4 %

## 2025-04-30 PROCEDURE — 1123F ACP DISCUSS/DSCN MKR DOCD: CPT | Performed by: NURSE PRACTITIONER

## 2025-04-30 PROCEDURE — 99214 OFFICE O/P EST MOD 30 MIN: CPT | Performed by: NURSE PRACTITIONER

## 2025-04-30 PROCEDURE — 83036 HEMOGLOBIN GLYCOSYLATED A1C: CPT | Performed by: NURSE PRACTITIONER

## 2025-04-30 ASSESSMENT — ENCOUNTER SYMPTOMS
DIARRHEA: 0
COLOR CHANGE: 0
NAUSEA: 0
SORE THROAT: 0
RHINORRHEA: 0
ABDOMINAL PAIN: 0
SHORTNESS OF BREATH: 0
VOMITING: 0
CHEST TIGHTNESS: 0

## 2025-04-30 NOTE — PROGRESS NOTES
protein drinks.  - Discussed strategies to manage nausea, including taking semaglutide with meals, particularly those rich in protein, and administering the medication at night post-dinner to potentially sleep through the side effects.    3. Infected skin cyst  Continue augmentin, warm compress, monitor for worsening s/s. Antibiotic ointment applied and bandaid replaced. No s/s worsening cellulitis or tracking redness/swelling.       Return in about 3 months (around 7/30/2025) for weight management .     Patient given educational materials - see patient instructions.  Discussed use, benefit, and side effects of prescribed medications.  All patient questions answered. Pt voiced understanding. Reviewed health maintenance.  Instructed to continue current medications, diet and exercise.  Patient agreed with treatment plan. Follow up as directed.     Electronicallysigned by BRIONNA Bernabe CNP on 4/30/2025 at 10:58 AM    The patient (or guardian, if applicable) and other individuals in attendance with the patient were advised that Artificial Intelligence will be utilized during this visit to record, process the conversation to generate a clinical note, and support improvement of the AI technology. The patient (or guardian, if applicable) and other individuals in attendance at the appointment consented to the use of AI, including the recording.

## 2025-05-08 ENCOUNTER — PATIENT MESSAGE (OUTPATIENT)
Dept: PRIMARY CARE CLINIC | Age: 65
End: 2025-05-08

## 2025-05-08 DIAGNOSIS — T50.905A DRUG-INDUCED NAUSEA AND VOMITING: ICD-10-CM

## 2025-05-08 DIAGNOSIS — R11.2 DRUG-INDUCED NAUSEA AND VOMITING: ICD-10-CM

## 2025-05-08 RX ORDER — ONDANSETRON 4 MG/1
4 TABLET, FILM COATED ORAL DAILY PRN
Qty: 30 TABLET | Refills: 0 | Status: SHIPPED | OUTPATIENT
Start: 2025-05-08

## 2025-05-12 ENCOUNTER — ANESTHESIA EVENT (OUTPATIENT)
Dept: OPERATING ROOM | Age: 65
End: 2025-05-12
Payer: MEDICARE

## 2025-05-12 NOTE — PROGRESS NOTES
Preoperative Instructions:    Stop eating solid foods at   the 24 hours prior to surgery . You are to be on clear liquids the 24 hours prior to surgery. .     Stop drinking clear liquids at midnight the night prior to your surgery.    Arrive at the surgery center (3rd entrance) on _____[id number]_________ by __0600-0630am, _____________.     Please stop any blood thinning medications as directed by your surgeon or prescribing physician. Failure to stop certain medications may interfere with your scheduled surgery. These may include: Aspirin, Coumadin, Plavix, NSAIDS (Motrin, Aleve, Advil, Mobic, Celebrex), Eliquis, Pradaxa, Xarelto, Fish oil, and herbal supplements.  PLEASE STOP Semaglutide  the 7-14  days prior to this surgery.      You may continue the rest of your medications through the night before surgery unless instructed otherwise.     Day of surgery please take only the following medication(s) with a small sip of water:Zofran as needed    Please shower with antibacterial soap and water  the morning of surgery.  Please use and bring inhalers the day of surgery. May use FLonase- nasal pray       Reminders:  -If you are going home the day of your procedure, you will need a family member or friend to stay during the procedure and drive you home after your procedure. Your  must be 18 years of age or older and able to sign off on your discharge instructions.    -If you are going home the same day of your surgery, someone must remain with you for the first 24 hours after your surgery if you receive sedation or anesthesia.     -Please do not wear any jewelry, lotions,  or body piercing the day of surgery

## 2025-05-16 ENCOUNTER — ANESTHESIA (OUTPATIENT)
Dept: OPERATING ROOM | Age: 65
End: 2025-05-16
Payer: MEDICARE

## 2025-05-16 ENCOUNTER — HOSPITAL ENCOUNTER (OUTPATIENT)
Age: 65
Setting detail: OUTPATIENT SURGERY
Discharge: HOME | End: 2025-05-16
Attending: STUDENT IN AN ORGANIZED HEALTH CARE EDUCATION/TRAINING PROGRAM | Admitting: STUDENT IN AN ORGANIZED HEALTH CARE EDUCATION/TRAINING PROGRAM
Payer: MEDICARE

## 2025-05-16 VITALS
SYSTOLIC BLOOD PRESSURE: 135 MMHG | HEART RATE: 65 BPM | WEIGHT: 187.2 LBS | RESPIRATION RATE: 16 BRPM | TEMPERATURE: 99 F | HEIGHT: 69 IN | DIASTOLIC BLOOD PRESSURE: 86 MMHG | OXYGEN SATURATION: 99 % | BODY MASS INDEX: 27.73 KG/M2

## 2025-05-16 DIAGNOSIS — R22.1 MASS OF NECK: ICD-10-CM

## 2025-05-16 PROCEDURE — 7100000010 HC PHASE II RECOVERY - FIRST 15 MIN: Performed by: STUDENT IN AN ORGANIZED HEALTH CARE EDUCATION/TRAINING PROGRAM

## 2025-05-16 PROCEDURE — 88304 TISSUE EXAM BY PATHOLOGIST: CPT

## 2025-05-16 PROCEDURE — 3700000001 HC ADD 15 MINUTES (ANESTHESIA): Performed by: STUDENT IN AN ORGANIZED HEALTH CARE EDUCATION/TRAINING PROGRAM

## 2025-05-16 PROCEDURE — 6370000000 HC RX 637 (ALT 250 FOR IP): Performed by: STUDENT IN AN ORGANIZED HEALTH CARE EDUCATION/TRAINING PROGRAM

## 2025-05-16 PROCEDURE — 3700000000 HC ANESTHESIA ATTENDED CARE: Performed by: STUDENT IN AN ORGANIZED HEALTH CARE EDUCATION/TRAINING PROGRAM

## 2025-05-16 PROCEDURE — 2500000003 HC RX 250 WO HCPCS: Performed by: STUDENT IN AN ORGANIZED HEALTH CARE EDUCATION/TRAINING PROGRAM

## 2025-05-16 PROCEDURE — 3600000002 HC SURGERY LEVEL 2 BASE: Performed by: STUDENT IN AN ORGANIZED HEALTH CARE EDUCATION/TRAINING PROGRAM

## 2025-05-16 PROCEDURE — 2709999900 HC NON-CHARGEABLE SUPPLY: Performed by: STUDENT IN AN ORGANIZED HEALTH CARE EDUCATION/TRAINING PROGRAM

## 2025-05-16 PROCEDURE — 3600000012 HC SURGERY LEVEL 2 ADDTL 15MIN: Performed by: STUDENT IN AN ORGANIZED HEALTH CARE EDUCATION/TRAINING PROGRAM

## 2025-05-16 PROCEDURE — 6360000002 HC RX W HCPCS: Performed by: NURSE ANESTHETIST, CERTIFIED REGISTERED

## 2025-05-16 PROCEDURE — 6360000002 HC RX W HCPCS: Performed by: STUDENT IN AN ORGANIZED HEALTH CARE EDUCATION/TRAINING PROGRAM

## 2025-05-16 PROCEDURE — 7100000011 HC PHASE II RECOVERY - ADDTL 15 MIN: Performed by: STUDENT IN AN ORGANIZED HEALTH CARE EDUCATION/TRAINING PROGRAM

## 2025-05-16 PROCEDURE — 2580000003 HC RX 258: Performed by: STUDENT IN AN ORGANIZED HEALTH CARE EDUCATION/TRAINING PROGRAM

## 2025-05-16 RX ORDER — FENTANYL CITRATE 50 UG/ML
INJECTION, SOLUTION INTRAMUSCULAR; INTRAVENOUS
Status: DISCONTINUED | OUTPATIENT
Start: 2025-05-16 | End: 2025-05-16 | Stop reason: SDUPTHER

## 2025-05-16 RX ORDER — MORPHINE SULFATE 2 MG/ML
1 INJECTION, SOLUTION INTRAMUSCULAR; INTRAVENOUS EVERY 5 MIN PRN
Status: DISCONTINUED | OUTPATIENT
Start: 2025-05-16 | End: 2025-05-16 | Stop reason: HOSPADM

## 2025-05-16 RX ORDER — NALOXONE HYDROCHLORIDE 0.4 MG/ML
INJECTION, SOLUTION INTRAMUSCULAR; INTRAVENOUS; SUBCUTANEOUS PRN
Status: DISCONTINUED | OUTPATIENT
Start: 2025-05-16 | End: 2025-05-16 | Stop reason: HOSPADM

## 2025-05-16 RX ORDER — SODIUM CHLORIDE 0.9 % (FLUSH) 0.9 %
5-40 SYRINGE (ML) INJECTION EVERY 12 HOURS SCHEDULED
Status: DISCONTINUED | OUTPATIENT
Start: 2025-05-16 | End: 2025-05-16 | Stop reason: HOSPADM

## 2025-05-16 RX ORDER — BUPIVACAINE HYDROCHLORIDE AND EPINEPHRINE 5; 5 MG/ML; UG/ML
INJECTION, SOLUTION PERINEURAL
Status: DISCONTINUED
Start: 2025-05-16 | End: 2025-05-16 | Stop reason: HOSPADM

## 2025-05-16 RX ORDER — METOCLOPRAMIDE HYDROCHLORIDE 5 MG/ML
10 INJECTION INTRAMUSCULAR; INTRAVENOUS
Status: DISCONTINUED | OUTPATIENT
Start: 2025-05-16 | End: 2025-05-16 | Stop reason: HOSPADM

## 2025-05-16 RX ORDER — LIDOCAINE HYDROCHLORIDE 10 MG/ML
INJECTION, SOLUTION EPIDURAL; INFILTRATION; INTRACAUDAL; PERINEURAL
Status: DISCONTINUED | OUTPATIENT
Start: 2025-05-16 | End: 2025-05-16 | Stop reason: SDUPTHER

## 2025-05-16 RX ORDER — PROPOFOL 10 MG/ML
INJECTION, EMULSION INTRAVENOUS
Status: DISCONTINUED | OUTPATIENT
Start: 2025-05-16 | End: 2025-05-16 | Stop reason: SDUPTHER

## 2025-05-16 RX ORDER — SODIUM CHLORIDE 0.9 % (FLUSH) 0.9 %
5-40 SYRINGE (ML) INJECTION PRN
Status: DISCONTINUED | OUTPATIENT
Start: 2025-05-16 | End: 2025-05-16 | Stop reason: HOSPADM

## 2025-05-16 RX ORDER — MIDAZOLAM HYDROCHLORIDE 1 MG/ML
INJECTION, SOLUTION INTRAMUSCULAR; INTRAVENOUS
Status: DISCONTINUED | OUTPATIENT
Start: 2025-05-16 | End: 2025-05-16 | Stop reason: SDUPTHER

## 2025-05-16 RX ORDER — LIDOCAINE HYDROCHLORIDE 10 MG/ML
1 INJECTION, SOLUTION EPIDURAL; INFILTRATION; INTRACAUDAL; PERINEURAL
Status: DISCONTINUED | OUTPATIENT
Start: 2025-05-16 | End: 2025-05-16 | Stop reason: HOSPADM

## 2025-05-16 RX ORDER — BUPIVACAINE HYDROCHLORIDE AND EPINEPHRINE 5; 5 MG/ML; UG/ML
INJECTION, SOLUTION EPIDURAL; INTRACAUDAL; PERINEURAL PRN
Status: DISCONTINUED | OUTPATIENT
Start: 2025-05-16 | End: 2025-05-16 | Stop reason: ALTCHOICE

## 2025-05-16 RX ORDER — DIPHENHYDRAMINE HYDROCHLORIDE 50 MG/ML
12.5 INJECTION, SOLUTION INTRAMUSCULAR; INTRAVENOUS
Status: DISCONTINUED | OUTPATIENT
Start: 2025-05-16 | End: 2025-05-16 | Stop reason: HOSPADM

## 2025-05-16 RX ORDER — LABETALOL HYDROCHLORIDE 5 MG/ML
10 INJECTION, SOLUTION INTRAVENOUS
Status: DISCONTINUED | OUTPATIENT
Start: 2025-05-16 | End: 2025-05-16 | Stop reason: HOSPADM

## 2025-05-16 RX ORDER — SODIUM CHLORIDE 9 MG/ML
INJECTION, SOLUTION INTRAVENOUS PRN
Status: DISCONTINUED | OUTPATIENT
Start: 2025-05-16 | End: 2025-05-16 | Stop reason: HOSPADM

## 2025-05-16 RX ORDER — GINSENG 100 MG
CAPSULE ORAL PRN
Status: DISCONTINUED | OUTPATIENT
Start: 2025-05-16 | End: 2025-05-16 | Stop reason: ALTCHOICE

## 2025-05-16 RX ORDER — ONDANSETRON 2 MG/ML
4 INJECTION INTRAMUSCULAR; INTRAVENOUS
Status: DISCONTINUED | OUTPATIENT
Start: 2025-05-16 | End: 2025-05-16 | Stop reason: HOSPADM

## 2025-05-16 RX ORDER — GINSENG 100 MG
CAPSULE ORAL
Status: DISCONTINUED
Start: 2025-05-16 | End: 2025-05-16 | Stop reason: HOSPADM

## 2025-05-16 RX ORDER — HYDRALAZINE HYDROCHLORIDE 20 MG/ML
10 INJECTION INTRAMUSCULAR; INTRAVENOUS
Status: DISCONTINUED | OUTPATIENT
Start: 2025-05-16 | End: 2025-05-16 | Stop reason: HOSPADM

## 2025-05-16 RX ORDER — OXYCODONE HYDROCHLORIDE 5 MG/1
5 TABLET ORAL PRN
Status: DISCONTINUED | OUTPATIENT
Start: 2025-05-16 | End: 2025-05-16 | Stop reason: HOSPADM

## 2025-05-16 RX ORDER — MEPERIDINE HYDROCHLORIDE 50 MG/ML
12.5 INJECTION INTRAMUSCULAR; INTRAVENOUS; SUBCUTANEOUS ONCE
Status: DISCONTINUED | OUTPATIENT
Start: 2025-05-16 | End: 2025-05-16 | Stop reason: HOSPADM

## 2025-05-16 RX ORDER — SODIUM CHLORIDE, SODIUM LACTATE, POTASSIUM CHLORIDE, CALCIUM CHLORIDE 600; 310; 30; 20 MG/100ML; MG/100ML; MG/100ML; MG/100ML
INJECTION, SOLUTION INTRAVENOUS CONTINUOUS
Status: DISCONTINUED | OUTPATIENT
Start: 2025-05-16 | End: 2025-05-16 | Stop reason: HOSPADM

## 2025-05-16 RX ORDER — OXYCODONE HYDROCHLORIDE 5 MG/1
10 TABLET ORAL PRN
Status: DISCONTINUED | OUTPATIENT
Start: 2025-05-16 | End: 2025-05-16 | Stop reason: HOSPADM

## 2025-05-16 RX ORDER — MIDAZOLAM HYDROCHLORIDE 2 MG/2ML
2 INJECTION, SOLUTION INTRAMUSCULAR; INTRAVENOUS
Status: DISCONTINUED | OUTPATIENT
Start: 2025-05-16 | End: 2025-05-16 | Stop reason: HOSPADM

## 2025-05-16 RX ADMIN — Medication 2000 MG: at 08:36

## 2025-05-16 RX ADMIN — PROPOFOL 20 MG: 10 INJECTION, EMULSION INTRAVENOUS at 08:41

## 2025-05-16 RX ADMIN — LIDOCAINE HYDROCHLORIDE 50 MG: 10 INJECTION, SOLUTION EPIDURAL; INFILTRATION; INTRACAUDAL; PERINEURAL at 08:34

## 2025-05-16 RX ADMIN — SODIUM CHLORIDE, SODIUM LACTATE, POTASSIUM CHLORIDE, AND CALCIUM CHLORIDE: .6; .31; .03; .02 INJECTION, SOLUTION INTRAVENOUS at 07:06

## 2025-05-16 RX ADMIN — PROPOFOL 30 MG: 10 INJECTION, EMULSION INTRAVENOUS at 08:34

## 2025-05-16 RX ADMIN — PROPOFOL 20 MG: 10 INJECTION, EMULSION INTRAVENOUS at 08:48

## 2025-05-16 RX ADMIN — MIDAZOLAM 2 MG: 1 INJECTION INTRAMUSCULAR; INTRAVENOUS at 08:30

## 2025-05-16 RX ADMIN — FENTANYL CITRATE 50 MCG: 50 INJECTION, SOLUTION INTRAMUSCULAR; INTRAVENOUS at 09:04

## 2025-05-16 RX ADMIN — FENTANYL CITRATE 50 MCG: 50 INJECTION, SOLUTION INTRAMUSCULAR; INTRAVENOUS at 08:33

## 2025-05-16 ASSESSMENT — PAIN - FUNCTIONAL ASSESSMENT: PAIN_FUNCTIONAL_ASSESSMENT: NONE - DENIES PAIN

## 2025-05-16 NOTE — BRIEF OP NOTE
Brief Postoperative Note      Patient: Fredrick Pete  YOB: 1960  MRN: [id number]    Date of Procedure: 5/16/2025    Pre-Op Diagnosis Codes:      * Mass of neck [R22.1]    Post-Op Diagnosis: Same       Procedure(s):  POSTERIOR NECK SOFT TISSUE MASS EXCISION    Surgeon(s):  Iban Cunningham DO    Assistant:  * No surgical staff found *    Anesthesia: Monitor Anesthesia Care    Estimated Blood Loss (mL): Minimal    Complications: None    Specimens:   ID Type Source Tests Collected by Time Destination   A : POSTERIOR NECK MASS Tissue Neck SURGICAL PATHOLOGY Iban Cunningham DO 5/16/2025 0854        Implants:  * No implants in log *      Drains: * No LDAs found *    Findings:  Infection Present At Time Of Surgery (PATOS) (choose all levels that have infection present):  No infection present  Other Findings: 1cm cyst  This procedure was not performed to treat primary cutaneous melanoma through wide local excision    Electronically signed by Iban Cunningham DO on 5/16/2025 at 9:04 AM

## 2025-05-16 NOTE — ANESTHESIA POSTPROCEDURE EVALUATION
Department of Anesthesiology  Postprocedure Note    Patient: Fredrick Pete  MRN: [id number]  YOB: 1960  Date of evaluation: 5/16/2025    Procedure Summary       Date: 05/16/25 Room / Location: [address]    Anesthesia Start: 0829 Anesthesia Stop: 0916    Procedure: POSTERIOR NECK SOFT TISSUE MASS EXCISION (Neck) Diagnosis:       Mass of neck      (Mass of neck [R22.1])    Surgeons: Iban Cunningham DO Responsible Provider: Amanda Mcguire MD    Anesthesia Type: MAC ASA Status: 2            Anesthesia Type: MAC    Joceline Phase I: Joceline Score: 10    Joceline Phase II: Joceline Score: 9    Anesthesia Post Evaluation    Patient location during evaluation: PACU  Patient participation: complete - patient participated  Level of consciousness: awake and alert  Airway patency: patent  Nausea & Vomiting: no nausea and no vomiting  Cardiovascular status: blood pressure returned to baseline  Respiratory status: acceptable and room air  Hydration status: euvolemic  Pain management: adequate and satisfactory to patient    No notable events documented.

## 2025-05-16 NOTE — DISCHARGE INSTRUCTIONS
Discharge Instructions:    Diet:   You may resume a regular diet.    Wound Care:   External sutures were used to close the incision. You may shower 24 hours after the procedure, but do not scrub the incision sites directly or soak (tub, pool, etc.). Each day after your shower place some antibiotic ointment (neosporin, bacitracin) over sutures. It is common to have a small amount of drainage initially after the procedure.    Activity:   Light activity for remainder of the day post surgery    Pain management:   Unless informed of any restrictions by your primary care physician, please use your preferred over-the-counter pain reliever as your primary pain medication.    Reasons to Return:   Some soreness and redness is common after surgery, especially for the first 24-48 hours. If, however, you experience for increasing redness, worsening pain, new and/or increasing drainage from wound, fever above 101.5 degrees Farenheit, bleeding that does not stop soon after discovery, or any other concerns about your incision or post op course, please either call the office or call/return to the Emergency Department for further evaluation.    Follow up with Dr. Cunningham in 2 weeks.

## 2025-05-16 NOTE — ANESTHESIA PRE PROCEDURE
3.2 oz)   Height:  1.753 m (5' 9\")                                              BP Readings from Last 3 Encounters:   05/16/25 122/82   04/30/25 118/80   02/15/25 (!) 156/85       NPO Status: Time of last liquid consumption: 1800                        Time of last solid consumption: 0700                        Date of last liquid consumption: 05/15/25                        Date of last solid food consumption: 05/15/25    BMI:   Wt Readings from Last 3 Encounters:   05/16/25 84.9 kg (187 lb 3.2 oz)   05/01/25 87.5 kg (193 lb)   04/30/25 87.6 kg (193 lb 3.2 oz)     Body mass index is 27.64 kg/m².    CBC:   Lab Results   Component Value Date/Time    WBC 5.8 01/19/2024 09:21 AM    RBC 4.82 01/19/2024 09:21 AM    HGB 16.1 01/19/2024 09:21 AM    HCT 48.3 01/19/2024 09:21 AM    .2 01/19/2024 09:21 AM    RDW 13.5 01/19/2024 09:21 AM     01/19/2024 09:21 AM       CMP:   Lab Results   Component Value Date/Time     01/19/2024 09:21 AM    K 4.5 01/19/2024 09:21 AM     01/19/2024 09:21 AM    CO2 19 01/19/2024 09:21 AM    BUN 18 01/19/2024 09:21 AM    CREATININE 0.9 01/19/2024 09:21 AM    GFRAA >60 02/17/2020 02:05 PM    LABGLOM >60 01/19/2024 09:21 AM    GLUCOSE 106 01/19/2024 09:21 AM    CALCIUM 9.5 01/19/2024 09:21 AM    BILITOT 0.2 01/19/2024 09:21 AM    ALKPHOS 68 01/19/2024 09:21 AM    AST 21 01/19/2024 09:21 AM    ALT 26 01/19/2024 09:21 AM       POC Tests: No results for input(s): \"POCGLU\", \"POCNA\", \"POCK\", \"POCCL\", \"POCBUN\", \"POCHEMO\", \"POCHCT\" in the last 72 hours.    Coags: No results found for: \"PROTIME\", \"INR\", \"APTT\"    HCG (If Applicable): No results found for: \"PREGTESTUR\", \"PREGSERUM\", \"HCG\", \"HCGQUANT\"     ABGs: No results found for: \"PHART\", \"PO2ART\", \"CWM4JSX\", \"GLM3YFW\", \"BEART\", \"E3MVVCSC\"     Type & Screen (If Applicable):  No results found for: \"ABORH\", \"LABANTI\"    Drug/Infectious Status (If Applicable):  Lab Results   Component Value Date/Time    HEPCAB NONREACTIVE 04/09/2017

## 2025-05-16 NOTE — H&P
Update History & Physical    The patient's History and Physical of May 1, 2025 was reviewed with the patient and I examined the patient. There was no change. The surgical site was confirmed by the patient and me.       Plan: The risks, benefits, expected outcome, and alternative to the recommended procedure have been discussed with the patient. Patient understands and wants to proceed with the procedure.     Electronically signed by Iban Cunningham DO on 5/16/2025 at 8:00 AM       SURGERY CLINIC NOTE     DATE: May 1, 2025      SUBJECTIVE:  MILEY YEN is a 65 y.o. male who presents with inflamed cyst of the posterior neck.  Patient states that he had this cyst excised back in 2020 however about a year after that procedure the cyst came back.  Since then has not caused him much issue however the past several days the cyst has become inflamed.  His wife was able to express some drainage from the cyst and he saw his primary care who placed him on a course of oral antibiotics.  Patient denies any blood thinning medications.           Past Medical History        Past Medical History:   Diagnosis Date    Adenomatous polyp      Arthritis      Diverticulosis      Hemorrhoids      High cholesterol 7/11/2016            Past Surgical History         Past Surgical History:   Procedure Laterality Date    COLONOSCOPY   04-15-16     ADENOMATOUS POLYP, DIVERTICULOSIS, INTERNAL AND EXTERNAL HEMORRHOIDS     COLONOSCOPY   02/12/2021    COLONOSCOPY N/A 2/12/2021     COLORECTAL CANCER SCREENING, NOT HIGH RISK performed by Solomon Dumont DO at [facility] OR    FINGER TRIGGER RELEASE Right      KNEE SURGERY Right      NECK SURGERY N/A 1/31/2020     EXCISION SUBCUTANEOUS LESION NECK performed by Jewel Alexander MD at [facility] OR    OTHER SURGICAL HISTORY   01/31/2020     EXCISION SUBCUTANEOUS LESION NECK (    VASECTOMY                Current Facility-Administered Medications          Current Outpatient Medications   Medication

## 2025-05-16 NOTE — OP NOTE
Operative Note      Patient: Fredrick Pete  YOB: 1960  MRN: [id number]    Date of Procedure: 5/16/2025    Pre-Op Diagnosis Codes:      * Mass of neck [R22.1]    Post-Op Diagnosis: Same       Procedure(s):  POSTERIOR NECK SOFT TISSUE MASS EXCISION    Surgeon(s):  Iban Cunningham DO    Assistant:   * No surgical staff found *    Anesthesia: Monitor Anesthesia Care    Estimated Blood Loss (mL): less than 50     Complications: None    Specimens:   ID Type Source Tests Collected by Time Destination   A : POSTERIOR NECK MASS Tissue Neck SURGICAL PATHOLOGY Iban Cunningham DO 5/16/2025 0854        Implants:  * No implants in log *      Drains: * No LDAs found *    Findings:  Infection Present At Time Of Surgery (PATOS) (choose all levels that have infection present):  No infection present  Other Findings: 1 cm cyst  This procedure was not performed to treat primary cutaneous melanoma through wide local excision    Detailed Description of Procedure:   Indication:  Patient presented to the surgery office with recurrent cyst on the back of his neck that become infected and was draining.  Patient wished to have the cyst removed.  Risk, benefits, and alternatives were discussed.  Informed sent was obtained.    Procedure:  Patient was taken back to the operative suite and placed in the prone position on the operating table.  Anesthesia team initiated a monitored anesthesia care.  Patient was given 2 g Ancef for surgical prophylaxis.  Neck was prepped and draped in the typical sterile fashion.  A timeout was called prior to incision to ensure proper patient, position, procedure being performed.  We marked out an incision over the cyst with the skin marker and then anesthetized this area with 0.5% Marcaine with epinephrine.  A 15 blade scalpel was used to create the incision.  Bovie electrocautery used to carry this incision down to the subcutaneous tissue.  The cyst was encountered and

## 2025-05-19 LAB — SURGICAL PATHOLOGY REPORT: NORMAL

## 2025-06-15 DIAGNOSIS — T50.905A DRUG-INDUCED NAUSEA AND VOMITING: ICD-10-CM

## 2025-06-15 DIAGNOSIS — R11.2 DRUG-INDUCED NAUSEA AND VOMITING: ICD-10-CM

## 2025-06-16 RX ORDER — ONDANSETRON 4 MG/1
TABLET, FILM COATED ORAL
Qty: 30 TABLET | Refills: 0 | Status: SHIPPED | OUTPATIENT
Start: 2025-06-16

## 2025-07-14 NOTE — DISCHARGE SUMMARY
[x] [facility]  Outpatient Rehabilitation &  Therapy  [address]  [phone]  [phone] [] [facility]  Outpatient Rehabilitation &  Therapy  [address]  P: [phone]  F: [phone] [] [facility]  Outpatient Rehabilitation &  Therapy  [address]  P: [phone]  F: [phone] [] [facility]  Outpatient Rehabilitation &  Therapy  [address]  [phone]  [phone] [x] [facility]  Outpatient Rehabilitation &  Therapy  [address]   P: [phone]  F: [phone]  [] [facility]  Outpatient Rehabilitation &  Therapy  [address]  P: [phone]  F: [phone] [] [facility]  Outpatient Rehabilitation &  Therapy  [address]  P: [phone]  F: [phone] [] [facility]  Outpatient Rehabilitation &  Therapy  [address]  P: [phone]  F: [phone] [] [facility]  Outpatient Rehabilitation &  Therapy  [address]  P: [phone]  F: [phone]  [] [facility] Outpatient Rehabilitation &  Therapy  [address]  P: [phone]  F: [phone] [] [facility] Outpatient Rehabilitation &  Therapy  [address]  P: [phone]   F: [phone]            Physical Therapy Discharge Note    Date: 2025      Patient: Fredrick Pete  : 1960  MRN: [id number]    Physician: BENJY House           Insurance: [facility] Medicare (6060 vs)  Medical Diagnosis: Internal derangement of knee, left (M23.92)                  Rehab Codes: M62.81 , M25.562, M25.662  Onset date: 25                                         Next 's appt.: 25  Date of initial visit: 3/26/25                Date of final visit:

## 2025-07-29 DIAGNOSIS — T50.905A DRUG-INDUCED NAUSEA AND VOMITING: ICD-10-CM

## 2025-07-29 DIAGNOSIS — R11.2 DRUG-INDUCED NAUSEA AND VOMITING: ICD-10-CM

## 2025-07-30 RX ORDER — ONDANSETRON 4 MG/1
TABLET, FILM COATED ORAL
Qty: 30 TABLET | Refills: 0 | Status: SHIPPED | OUTPATIENT
Start: 2025-07-30

## (undated) DEVICE — SYRINGE MED 10ML LUERLOCK TIP W/O SFTY DISP

## (undated) DEVICE — PAD,NON-ADHERENT,3X8,STERILE,LF,1/PK: Brand: MEDLINE

## (undated) DEVICE — INTENDED FOR TISSUE SEPARATION, AND OTHER PROCEDURES THAT REQUIRE A SHARP SURGICAL BLADE TO PUNCTURE OR CUT.: Brand: BARD-PARKER ® CARBON RIB-BACK BLADES

## (undated) DEVICE — MASTISOL ADHESIVE LIQ 2/3ML

## (undated) DEVICE — SOLUTION IRRIG 1000ML 09% SOD CHL USP PIC PLAS CONTAINER

## (undated) DEVICE — CANNULA NSL AD L2IN ETCO2 SAMP SFT CRUSH RESIST FEM AIRLFE

## (undated) DEVICE — SUTURE VICRYL + SZ 3-0 L27IN ABSRB UD L26MM SH 1/2 CIR VCP416H

## (undated) DEVICE — 60 ML SYRINGE REGULAR TIP: Brand: MONOJECT

## (undated) DEVICE — ELECTRODE PT RET AD L9FT HI MOIST COND ADH HYDRGEL CORDED

## (undated) DEVICE — ELECTRODE ELECSURG NDL 2.8 INX7.2 CM COAT INSUL EDGE

## (undated) DEVICE — GOWN ,SIRUS ,NONREINFORCED 4XL: Brand: MEDLINE

## (undated) DEVICE — MINOR BSIN PK

## (undated) DEVICE — SKIN PREP TRAY W/CHG: Brand: MEDLINE INDUSTRIES, INC.

## (undated) DEVICE — GLOVE ORANGE PI 8   MSG9080

## (undated) DEVICE — STRIP,CLOSURE,WOUND,MEDI-STRIP,1/2X4: Brand: MEDLINE

## (undated) DEVICE — 1200CC GUARDIAN II: Brand: GUARDIAN

## (undated) DEVICE — SOLUTION SCRB 4OZ 7.5% POVIDONE IOD ANTIMIC BTL

## (undated) DEVICE — MHPB HEAD AND NECK  PACK: Brand: MEDLINE INDUSTRIES, INC.

## (undated) DEVICE — MERCY DEFIANCE ENDO KIT: Brand: MEDLINE INDUSTRIES, INC.

## (undated) DEVICE — YANKAUER,FLEXIBLE HANDLE,REGLR CAPACITY: Brand: MEDLINE INDUSTRIES, INC.

## (undated) DEVICE — PREMIUM DRY TRAY LF: Brand: MEDLINE INDUSTRIES, INC.

## (undated) DEVICE — COLONOSCOPE ENDOSCP ABSORBENT SM PEDIATRIC 104 MM VISION